# Patient Record
Sex: FEMALE | Race: OTHER | Employment: UNEMPLOYED | URBAN - METROPOLITAN AREA
[De-identification: names, ages, dates, MRNs, and addresses within clinical notes are randomized per-mention and may not be internally consistent; named-entity substitution may affect disease eponyms.]

---

## 2018-11-29 ENCOUNTER — HOSPITAL ENCOUNTER (EMERGENCY)
Facility: HOSPITAL | Age: 28
Discharge: HOME | End: 2018-11-29
Attending: EMERGENCY MEDICINE
Payer: MEDICAID

## 2018-11-29 VITALS
TEMPERATURE: 98 F | SYSTOLIC BLOOD PRESSURE: 109 MMHG | DIASTOLIC BLOOD PRESSURE: 61 MMHG | HEIGHT: 64 IN | BODY MASS INDEX: 43.71 KG/M2 | RESPIRATION RATE: 15 BRPM | HEART RATE: 79 BPM | OXYGEN SATURATION: 98 % | WEIGHT: 256 LBS

## 2018-11-29 DIAGNOSIS — N30.00 ACUTE CYSTITIS WITHOUT HEMATURIA: Primary | ICD-10-CM

## 2018-11-29 PROCEDURE — 96361 HYDRATE IV INFUSION ADD-ON: CPT

## 2018-11-29 PROCEDURE — 85025 COMPLETE CBC W/AUTO DIFF WBC: CPT | Performed by: EMERGENCY MEDICINE

## 2018-11-29 PROCEDURE — 87086 URINE CULTURE/COLONY COUNT: CPT | Performed by: EMERGENCY MEDICINE

## 2018-11-29 PROCEDURE — 80053 COMPREHEN METABOLIC PANEL: CPT | Performed by: EMERGENCY MEDICINE

## 2018-11-29 PROCEDURE — 81025 URINE PREGNANCY TEST: CPT

## 2018-11-29 PROCEDURE — 81001 URINALYSIS AUTO W/SCOPE: CPT | Performed by: EMERGENCY MEDICINE

## 2018-11-29 PROCEDURE — 96360 HYDRATION IV INFUSION INIT: CPT

## 2018-11-29 PROCEDURE — 99284 EMERGENCY DEPT VISIT MOD MDM: CPT

## 2018-11-29 RX ORDER — SULFAMETHOXAZOLE AND TRIMETHOPRIM 800; 160 MG/1; MG/1
1 TABLET ORAL 2 TIMES DAILY
Qty: 14 TABLET | Refills: 0 | Status: SHIPPED | OUTPATIENT
Start: 2018-11-29 | End: 2018-12-06

## 2018-11-29 RX ORDER — CEPHALEXIN 250 MG/1
250 CAPSULE ORAL 4 TIMES DAILY
COMMUNITY

## 2018-11-29 RX ORDER — SODIUM CHLORIDE 9 MG/ML
125 INJECTION, SOLUTION INTRAVENOUS CONTINUOUS
Status: DISCONTINUED | OUTPATIENT
Start: 2018-11-29 | End: 2018-11-29

## 2018-11-29 NOTE — ED INITIAL ASSESSMENT (HPI)
PT STATES SHE WAS DIAGNOSED WITH UTI 3 DAYS AGO, PT WAS GIVEN ANTIBIOTICS. PT IS HERE DUE TO SHE'S TILL EXPERIENCING PAIN ON HER LEFT LOWER BACK RADIATING TO THE FRONT.

## 2018-11-29 NOTE — ED PROVIDER NOTES
Patient Seen in: [facility] Emergency Department    History   Patient presents with:  Urinary Symptoms (urologic)  Back Pain (musculoskeletal)    Stated Complaint: Flank pain since last week.  Has UTI and has been on Cepalexin since 11/27    HPI    2 murmurs, regular rate and rhythm  Lungs: Clear to auscultation bilaterally  Abdomen: Soft nontender nondistended normal active bowel sounds without rebound, guarding or masses noted  Back left CVA region is mildly  Extremity no clubbing, cyanosis or edema records from the patient's emergency room visit the other day and her CT at that time was normal.  I do not feel exposing her second CT at this time is warranted but I feel the patient with her urine still being symptomatic in her urine consistent with uri

## 2018-11-29 NOTE — ED NOTES
PT REEVALUATED BY ER PHYSICIAN. INFORMED OF ALL HER TEST REPORTS AND PLAN OF CARE. PT VERBALIZING UNDERSTANDING.

## 2023-06-12 ENCOUNTER — APPOINTMENT (OUTPATIENT)
Dept: GENERAL RADIOLOGY | Facility: HOSPITAL | Age: 33
End: 2023-06-12
Attending: EMERGENCY MEDICINE
Payer: MEDICAID

## 2023-06-12 ENCOUNTER — HOSPITAL ENCOUNTER (OUTPATIENT)
Facility: HOSPITAL | Age: 33
Setting detail: OBSERVATION
Discharge: HOME | End: 2023-06-13
Attending: EMERGENCY MEDICINE | Admitting: HOSPITALIST
Payer: MEDICAID

## 2023-06-12 DIAGNOSIS — T78.2XXA ANAPHYLAXIS, INITIAL ENCOUNTER: ICD-10-CM

## 2023-06-12 DIAGNOSIS — J02.0 STREPTOCOCCAL SORE THROAT: ICD-10-CM

## 2023-06-12 DIAGNOSIS — A41.9 SEPSIS DUE TO UNDETERMINED ORGANISM (HCC): ICD-10-CM

## 2023-06-12 DIAGNOSIS — E87.20 METABOLIC ACIDOSIS: ICD-10-CM

## 2023-06-12 DIAGNOSIS — R73.9 HYPERGLYCEMIA: ICD-10-CM

## 2023-06-12 DIAGNOSIS — E11.65 TYPE 2 DIABETES MELLITUS WITH HYPERGLYCEMIA, WITHOUT LONG-TERM CURRENT USE OF INSULIN (HCC): Primary | ICD-10-CM

## 2023-06-12 PROBLEM — E87.1 HYPONATREMIA: Status: ACTIVE | Noted: 2023-06-12

## 2023-06-12 LAB
ALBUMIN SERPL-MCNC: 2.9 G/DL (ref 3.4–5)
ALBUMIN/GLOB SERPL: 0.6 {RATIO} (ref 1–2)
ALP LIVER SERPL-CCNC: 89 U/L
ALT SERPL-CCNC: 74 U/L
ANION GAP SERPL CALC-SCNC: 7 MMOL/L (ref 0–18)
AST SERPL-CCNC: 33 U/L (ref 15–37)
B-HCG UR QL: NEGATIVE
BASE EXCESS BLDV CALC-SCNC: -1.4 MMOL/L
BASOPHILS # BLD AUTO: 0.02 X10(3) UL (ref 0–0.2)
BASOPHILS NFR BLD AUTO: 0.2 %
BILIRUB SERPL-MCNC: 0.5 MG/DL (ref 0.1–2)
BILIRUB UR QL STRIP.AUTO: NEGATIVE
BUN BLD-MCNC: 13 MG/DL (ref 7–18)
CALCIUM BLD-MCNC: 8.6 MG/DL (ref 8.5–10.1)
CHLORIDE SERPL-SCNC: 103 MMOL/L (ref 98–112)
CLARITY UR REFRACT.AUTO: CLEAR
CO2 SERPL-SCNC: 18 MMOL/L (ref 21–32)
COLOR UR AUTO: YELLOW
CREAT BLD-MCNC: 0.98 MG/DL
EOSINOPHIL # BLD AUTO: 0.02 X10(3) UL (ref 0–0.7)
EOSINOPHIL NFR BLD AUTO: 0.2 %
ERYTHROCYTE [DISTWIDTH] IN BLOOD BY AUTOMATED COUNT: 14.7 %
GFR SERPLBLD BASED ON 1.73 SQ M-ARVRAT: 78 ML/MIN/1.73M2 (ref 60–?)
GLOBULIN PLAS-MCNC: 4.9 G/DL (ref 2.8–4.4)
GLUCOSE BLD-MCNC: 345 MG/DL (ref 70–99)
GLUCOSE BLD-MCNC: 358 MG/DL (ref 70–99)
GLUCOSE UR STRIP.AUTO-MCNC: >=500 MG/DL
HCO3 BLDV-SCNC: 23.8 MEQ/L (ref 22–26)
HCT VFR BLD AUTO: 42.6 %
HGB BLD-MCNC: 13.4 G/DL
IMM GRANULOCYTES # BLD AUTO: 0.03 X10(3) UL (ref 0–1)
IMM GRANULOCYTES NFR BLD: 0.2 %
KETONES UR STRIP.AUTO-MCNC: 20 MG/DL
LACTATE SERPL-SCNC: 2.4 MMOL/L (ref 0.4–2)
LACTATE SERPL-SCNC: 4.2 MMOL/L (ref 0.4–2)
LACTATE SERPL-SCNC: 4.8 MMOL/L (ref 0.4–2)
LEUKOCYTE ESTERASE UR QL STRIP.AUTO: NEGATIVE
LYMPHOCYTES # BLD AUTO: 1.63 X10(3) UL (ref 1–4)
LYMPHOCYTES NFR BLD AUTO: 13.2 %
MAGNESIUM SERPL-MCNC: 1.8 MG/DL (ref 1.6–2.6)
MCH RBC QN AUTO: 23.7 PG (ref 26–34)
MCHC RBC AUTO-ENTMCNC: 31.5 G/DL (ref 31–37)
MCV RBC AUTO: 75.3 FL
MONOCYTES # BLD AUTO: 0.26 X10(3) UL (ref 0.1–1)
MONOCYTES NFR BLD AUTO: 2.1 %
NEUTROPHILS # BLD AUTO: 10.36 X10 (3) UL (ref 1.5–7.7)
NEUTROPHILS # BLD AUTO: 10.36 X10(3) UL (ref 1.5–7.7)
NEUTROPHILS NFR BLD AUTO: 84.1 %
NITRITE UR QL STRIP.AUTO: POSITIVE
OSMOLALITY SERPL CALC.SUM OF ELEC: 281 MOSM/KG (ref 275–295)
OXYHGB MFR BLDV: 95.1 % (ref 72–78)
PCO2 BLDV: 27 MM HG (ref 38–50)
PH BLDV: 7.49 [PH] (ref 7.33–7.43)
PH UR STRIP.AUTO: 5 [PH] (ref 5–8)
PLATELET # BLD AUTO: 330 10(3)UL (ref 150–450)
PO2 BLDV: 83 MM HG (ref 30–50)
POTASSIUM SERPL-SCNC: 4.3 MMOL/L (ref 3.5–5.1)
PROT SERPL-MCNC: 7.8 G/DL (ref 6.4–8.2)
PROT UR STRIP.AUTO-MCNC: NEGATIVE MG/DL
RBC # BLD AUTO: 5.66 X10(6)UL
SARS-COV-2 RNA RESP QL NAA+PROBE: NOT DETECTED
SODIUM SERPL-SCNC: 128 MMOL/L (ref 136–145)
SP GR UR STRIP.AUTO: >1.03 (ref 1–1.03)
UROBILINOGEN UR STRIP.AUTO-MCNC: <2 MG/DL
WBC # BLD AUTO: 12.3 X10(3) UL (ref 4–11)

## 2023-06-12 PROCEDURE — 71045 X-RAY EXAM CHEST 1 VIEW: CPT | Performed by: EMERGENCY MEDICINE

## 2023-06-12 PROCEDURE — 99223 1ST HOSP IP/OBS HIGH 75: CPT | Performed by: HOSPITALIST

## 2023-06-12 PROCEDURE — 93010 ELECTROCARDIOGRAM REPORT: CPT | Performed by: INTERNAL MEDICINE

## 2023-06-12 RX ORDER — SODIUM CHLORIDE 9 MG/ML
INJECTION, SOLUTION INTRAVENOUS CONTINUOUS
Status: ACTIVE | OUTPATIENT
Start: 2023-06-12 | End: 2023-06-12

## 2023-06-12 RX ORDER — DEXTROSE MONOHYDRATE 25 G/50ML
50 INJECTION, SOLUTION INTRAVENOUS
Status: DISCONTINUED | OUTPATIENT
Start: 2023-06-12 | End: 2023-06-13

## 2023-06-12 RX ORDER — DIPHENHYDRAMINE HYDROCHLORIDE 50 MG/ML
75 INJECTION INTRAMUSCULAR; INTRAVENOUS ONCE
Status: COMPLETED | OUTPATIENT
Start: 2023-06-12 | End: 2023-06-12

## 2023-06-12 RX ORDER — FAMOTIDINE 10 MG/ML
20 INJECTION, SOLUTION INTRAVENOUS ONCE
Status: COMPLETED | OUTPATIENT
Start: 2023-06-12 | End: 2023-06-12

## 2023-06-12 RX ORDER — ENOXAPARIN SODIUM 100 MG/ML
0.5 INJECTION SUBCUTANEOUS DAILY
Status: DISCONTINUED | OUTPATIENT
Start: 2023-06-13 | End: 2023-06-13

## 2023-06-12 RX ORDER — ACETAMINOPHEN 500 MG
500 TABLET ORAL EVERY 4 HOURS PRN
Status: DISCONTINUED | OUTPATIENT
Start: 2023-06-12 | End: 2023-06-13

## 2023-06-12 RX ORDER — PROCHLORPERAZINE EDISYLATE 5 MG/ML
5 INJECTION INTRAMUSCULAR; INTRAVENOUS EVERY 8 HOURS PRN
Status: DISCONTINUED | OUTPATIENT
Start: 2023-06-12 | End: 2023-06-13

## 2023-06-12 RX ORDER — NICOTINE POLACRILEX 4 MG
30 LOZENGE BUCCAL
Status: DISCONTINUED | OUTPATIENT
Start: 2023-06-12 | End: 2023-06-13

## 2023-06-12 RX ORDER — NICOTINE POLACRILEX 4 MG
15 LOZENGE BUCCAL
Status: DISCONTINUED | OUTPATIENT
Start: 2023-06-12 | End: 2023-06-13

## 2023-06-12 RX ORDER — ONDANSETRON 2 MG/ML
4 INJECTION INTRAMUSCULAR; INTRAVENOUS EVERY 4 HOURS PRN
Status: DISCONTINUED | OUTPATIENT
Start: 2023-06-12 | End: 2023-06-12

## 2023-06-12 RX ORDER — INSULIN ASPART 100 [IU]/ML
0.15 INJECTION, SOLUTION INTRAVENOUS; SUBCUTANEOUS ONCE
Status: COMPLETED | OUTPATIENT
Start: 2023-06-12 | End: 2023-06-12

## 2023-06-12 RX ORDER — METHYLPREDNISOLONE SODIUM SUCCINATE 125 MG/2ML
125 INJECTION, POWDER, LYOPHILIZED, FOR SOLUTION INTRAMUSCULAR; INTRAVENOUS ONCE
Status: COMPLETED | OUTPATIENT
Start: 2023-06-12 | End: 2023-06-12

## 2023-06-12 RX ORDER — SODIUM CHLORIDE 9 MG/ML
INJECTION, SOLUTION INTRAVENOUS CONTINUOUS
Status: DISCONTINUED | OUTPATIENT
Start: 2023-06-12 | End: 2023-06-13

## 2023-06-12 RX ORDER — ONDANSETRON 2 MG/ML
4 INJECTION INTRAMUSCULAR; INTRAVENOUS EVERY 6 HOURS PRN
Status: DISCONTINUED | OUTPATIENT
Start: 2023-06-12 | End: 2023-06-13

## 2023-06-12 NOTE — ED INITIAL ASSESSMENT (HPI)
Allergic reactions since last night after she  Took some naproxen and cyclobenzaprine  Yesterday . Patient  Seen her doctor because of right arm numbness. Now swelling all over the face rashes mild shortness of breath .

## 2023-06-12 NOTE — ED QUICK NOTES
Orders for admission, patient is aware of plan and ready to go upstairs. Any questions, please call ED RN kendell at extension [phone]. Patient Covid vaccination status: Unvaccinated     COVID Test Ordered in ED: Rapid SARS-CoV-2 by PCR    COVID Suspicion at Admission: N/A    Running Infusions: sepsis bolus almost finished     Mental Status/LOC at time of transport: a&Ox4    Other pertinent information: probable DKA, sepsis bolus almost done, pending blood cultures. Repeat lactic will be drawn prior to coming up.    CIWA score: N/A   NIH score:  N/A

## 2023-06-13 VITALS
TEMPERATURE: 98 F | BODY MASS INDEX: 45.14 KG/M2 | HEIGHT: 62 IN | OXYGEN SATURATION: 94 % | DIASTOLIC BLOOD PRESSURE: 71 MMHG | RESPIRATION RATE: 18 BRPM | WEIGHT: 245.31 LBS | SYSTOLIC BLOOD PRESSURE: 119 MMHG | HEART RATE: 89 BPM

## 2023-06-13 LAB
ANION GAP SERPL CALC-SCNC: 7 MMOL/L (ref 0–18)
ATRIAL RATE: 115 BPM
BASOPHILS # BLD AUTO: 0.01 X10(3) UL (ref 0–0.2)
BASOPHILS NFR BLD AUTO: 0.1 %
BUN BLD-MCNC: 8 MG/DL (ref 7–18)
CALCIUM BLD-MCNC: 8.1 MG/DL (ref 8.5–10.1)
CHLORIDE SERPL-SCNC: 107 MMOL/L (ref 98–112)
CO2 SERPL-SCNC: 23 MMOL/L (ref 21–32)
CREAT BLD-MCNC: 0.47 MG/DL
EOSINOPHIL # BLD AUTO: 0 X10(3) UL (ref 0–0.7)
EOSINOPHIL NFR BLD AUTO: 0 %
ERYTHROCYTE [DISTWIDTH] IN BLOOD BY AUTOMATED COUNT: 14.6 %
EST. AVERAGE GLUCOSE BLD GHB EST-MCNC: 246 MG/DL (ref 68–126)
GFR SERPLBLD BASED ON 1.73 SQ M-ARVRAT: 128 ML/MIN/1.73M2 (ref 60–?)
GLUCOSE BLD-MCNC: 282 MG/DL (ref 70–99)
GLUCOSE BLD-MCNC: 287 MG/DL (ref 70–99)
GLUCOSE BLD-MCNC: 294 MG/DL (ref 70–99)
HBA1C MFR BLD: 10.2 % (ref ?–5.7)
HCT VFR BLD AUTO: 34.6 %
HGB BLD-MCNC: 10.5 G/DL
IMM GRANULOCYTES # BLD AUTO: 0.04 X10(3) UL (ref 0–1)
IMM GRANULOCYTES NFR BLD: 0.4 %
LYMPHOCYTES # BLD AUTO: 1.52 X10(3) UL (ref 1–4)
LYMPHOCYTES NFR BLD AUTO: 16.2 %
MCH RBC QN AUTO: 23.1 PG (ref 26–34)
MCHC RBC AUTO-ENTMCNC: 30.3 G/DL (ref 31–37)
MCV RBC AUTO: 76 FL
MONOCYTES # BLD AUTO: 0.24 X10(3) UL (ref 0.1–1)
MONOCYTES NFR BLD AUTO: 2.6 %
NEUTROPHILS # BLD AUTO: 7.56 X10 (3) UL (ref 1.5–7.7)
NEUTROPHILS # BLD AUTO: 7.56 X10(3) UL (ref 1.5–7.7)
NEUTROPHILS NFR BLD AUTO: 80.7 %
OSMOLALITY SERPL CALC.SUM OF ELEC: 293 MOSM/KG (ref 275–295)
P AXIS: 67 DEGREES
P-R INTERVAL: 144 MS
PLATELET # BLD AUTO: 259 10(3)UL (ref 150–450)
POTASSIUM SERPL-SCNC: 3.9 MMOL/L (ref 3.5–5.1)
Q-T INTERVAL: 370 MS
QRS DURATION: 132 MS
QTC CALCULATION (BEZET): 511 MS
R AXIS: -17 DEGREES
RBC # BLD AUTO: 4.55 X10(6)UL
SODIUM SERPL-SCNC: 137 MMOL/L (ref 136–145)
T AXIS: 46 DEGREES
VENTRICULAR RATE: 115 BPM
WBC # BLD AUTO: 9.4 X10(3) UL (ref 4–11)

## 2023-06-13 PROCEDURE — 99232 SBSQ HOSP IP/OBS MODERATE 35: CPT | Performed by: CLINICAL NURSE SPECIALIST

## 2023-06-13 PROCEDURE — 99239 HOSP IP/OBS DSCHRG MGMT >30: CPT | Performed by: HOSPITALIST

## 2023-06-13 RX ORDER — METFORMIN HYDROCHLORIDE 750 MG/1
750 TABLET, EXTENDED RELEASE ORAL DAILY
Qty: 30 TABLET | Refills: 0 | Status: SHIPPED | OUTPATIENT
Start: 2023-06-13

## 2023-06-13 RX ORDER — MAGNESIUM OXIDE 400 MG/1
400 TABLET ORAL ONCE
Status: COMPLETED | OUTPATIENT
Start: 2023-06-13 | End: 2023-06-13

## 2023-06-13 RX ORDER — BLOOD-GLUCOSE METER
1 EACH MISCELLANEOUS
Qty: 1 KIT | Refills: 0 | Status: SHIPPED | OUTPATIENT
Start: 2023-06-13

## 2023-06-13 RX ORDER — LANCETS
EACH MISCELLANEOUS
Qty: 100 EACH | Refills: 2 | Status: SHIPPED | OUTPATIENT
Start: 2023-06-13 | End: 2023-12-10

## 2023-06-13 RX ORDER — PENICILLIN V POTASSIUM 500 MG/1
500 TABLET ORAL 3 TIMES DAILY
Qty: 30 TABLET | Refills: 0 | Status: SHIPPED | OUTPATIENT
Start: 2023-06-13 | End: 2023-06-23

## 2023-06-13 RX ORDER — INSULIN LISPRO 100 [IU]/ML
INJECTION, SOLUTION INTRAVENOUS; SUBCUTANEOUS
Qty: 5 EACH | Refills: 0 | Status: SHIPPED | OUTPATIENT
Start: 2023-06-13

## 2023-06-13 RX ORDER — INSULIN GLARGINE 100 [IU]/ML
30 INJECTION, SOLUTION SUBCUTANEOUS EVERY MORNING
Qty: 5 EACH | Refills: 0 | Status: SHIPPED | OUTPATIENT
Start: 2023-06-13

## 2023-06-13 RX ORDER — PERPHENAZINE 16 MG/1
TABLET, FILM COATED ORAL
Qty: 100 STRIP | Refills: 2 | Status: SHIPPED | OUTPATIENT
Start: 2023-06-13

## 2023-06-13 NOTE — PLAN OF CARE
Patient A/O x4. VSS, Afebrile. Ambulatory. Voids. 1800 ADA diet; tolerating well. New onset Diabetes. Diabetes educator consulted. New onset and Carb Count videos watched by patient. Glucose monitoring and insulin injection education given at beside by this RN. All medications given per [facility]. Safety precautions in place. Call light within reach. Patient has been cleared for discharge. NURSING DISCHARGE NOTE    Discharged Home via Ambulatory. Accompanied by RN  Belongings Taken by patient/family.       Problem: Diabetes/Glucose Control  Goal: Glucose maintained within prescribed range  Description: INTERVENTIONS:  - Monitor Blood Glucose as ordered  - Assess for signs and symptoms of hyperglycemia and hypoglycemia  - Administer ordered medications to maintain glucose within target range  - Assess barriers to adequate nutritional intake and initiate nutrition consult as needed  - Instruct patient on self management of diabetes  Outcome: Progressing

## 2023-06-13 NOTE — PLAN OF CARE
NURSING ADMISSION NOTE      Patient admitted via cart. Oriented to room. Safety precautions initiated. Bed in low position. Call light in reach. Received pt alert and oriented x4. VSS. Afebrile. Pt c/o neck pain but no other complaints. Navigator completed at bedside. IVF infusing. Insulin administered per MAR orders. Safety precautions in place and call light within reach.

## 2023-06-13 NOTE — CM/SW NOTE
SW and RN discussed patient during morning rounds. At this time, there are no identified discharge planning needs per nursing. Please place consult for social work if any needs are determined. SW will continue to follow for plan of care changes and remain available for any additional DC needs or concerns.      Chaim Tobar MSW, LSW  Discharge Planner   [phone]

## 2023-06-14 ENCOUNTER — PATIENT MESSAGE (OUTPATIENT)
Dept: ENDOCRINOLOGY CLINIC | Facility: CLINIC | Age: 33
End: 2023-06-14

## 2023-06-15 NOTE — DISCHARGE SUMMARY
[facility]ITAL[facility]  DISCHARGE SUMMARY     Dulce Maria Jasso Patient Status:  Inpatient    1990 MRN [id number]   [facility] 4NW-A Attending [facility] Day # 1 PCP Hellen Celaya MD     Date of Admission:  2023  Date of Discharge:   2023    Discharge Disposition: Home or Self Care    Discharge Diagnosis:    #New onset DM2 with hyperglycemia; A1c: 10.2  #Strep throat  #Metabolic Acidosis  #allergic reaction      History of Present Illness:    Bess Clark is a 28year old female with no significant past medical history presents to the ER with complaints of weakness and possible allergic reaction. Patient was seen at [facility] in the ER yesterday. She presented to the ER with right arm numbness and weakness. She received a shot of Toradol and was discharged home with Flexeril. She states that when she returned home she had trouble breathing and developed a rash all over her body. Patient states her symptoms persisted and she came into the ER for further evaluation. She denies any previous allergies to medications or unusual foods been ingested. On arrival, work-up was consistent with significantly elevated lactic acid. Patient was also noted to be hyerglycemic. She denies previous history of DM but states she had gestational diabetes over 10 years ago with one her children. She also c/o polyura, polydipsia and weight gain. Brief Synopsis:    The patient was admitted secondary to new onset type 2 diabetes and strep throat. She was started on insulin and antibiotics. She remained medically stable. She also had allergic reaction in the emergency room. Allergic symptoms had completely resolved. She was stable for discharge home on a course of antibiotics and insulin for her diabetes. She will follow-up with an allergist as outpatient. She is to follow with her primary care doctor as well.     Lace+ Score: 28  59-90 High Risk  29-58 Medium Risk  0-28   Low Risk       TCM Follow-Up Recommendation:  LACE 29-58: Moderate Risk of readmission after discharge from the hospital.        Discharge Medication List:     Discharge Medications        START taking these medications        Instructions Prescription details   Contour Next EZ w/Device Kit      1 kit 3 (three) times daily before meals. Test blood glucose 3-4 times before meals and for signs/symptoms of hypoglycemia   Quantity: 1 kit  Refills: 0     Contour Next Test Strp  Generic drug: Glucose Blood      Test blood glucose 3-4 times before meals and for signs/symptoms of hypoglycemia   Quantity: 100 strip  Refills: 2     Insulin Lispro (1 Unit Dial) 100 UNIT/ML Sopn  Commonly known as: HumaLOG KwikPen      Test blood glucose 3 times daily before meals. Inject 1 unit of insulin for every 30 points blood glucose is greater than 140 mg/dL. Inject insulin into the skin prior to meals. Inject 1 unit of insulin for every 8 grams of carbohydrates eaten Inject within 10 minutes before or after a meal.   Quantity: 5 each  Refills: 0     Lantus SoloStar 100 UNIT/ML Sopn  Generic drug: insulin glargine      Inject 30 Units into the skin every morning. Quantity: 5 each  Refills: 0     metFORMIN  MG Tb24  Commonly known as: Glucophage XR      Take 1 tablet (750 mg total) by mouth daily. Quantity: 30 tablet  Refills: 0     Microlet Lancets Misc      Test blood glucose 3-4 times before meals and for signs/symptoms of hypoglycemia   Quantity: 100 each  Refills: 2     penicillin v potassium 500 MG Tabs  Commonly known as: Veetid      Take 1 tablet (500 mg total) by mouth 3 (three) times daily for 10 days.    Stop taking on: June 23, 2023  Quantity: 30 tablet  Refills: 0            STOP taking these medications      cephalexin 250 MG Caps  Commonly known as: Keflex                  Where to Get Your Medications        These medications were sent to [address] [facility], [address] AT [facility], [phone], [phone]  [address] [facility], [address]      Phone: [phone]   Contour Next Test Strp  Insulin Lispro (1 Unit Dial) 100 UNIT/ML Sopn  Lantus SoloStar 100 UNIT/ML Sopn  metFORMIN  MG Tb24  penicillin v potassium 500 MG Tabs       Please  your prescriptions at the location directed by your doctor or nurse    Bring a paper prescription for each of these medications  Contour Next EZ w/Device Kit  Microlet Lancets Misc         [place]    Follow-up appointment:   Raul Alcazar  [id number] [phone]    Go on 6/22/2023  For your hospital follow up  Thursday 6/22 @9am    Appointments for Next 30 Days 6/14/2023 - 7/14/2023        Date Arrival Time Visit Type Length Department Provider     6/16/2023  9:30 AM  DIAB FOLLOW UP [1917] 60 min [address], [facility], [address] [place] [address]    Patient Instructions:     Classes and/or appointments are held at the [facility] located at [place] [address], [address] [facility], [address]    WHO TO CONTACT TO CHANGE APPOINTMENT   If you need to make changes to your appointment or have questions,  please call the Diabetes Center at [phone]. TOPICS DISCUSSED IN CLASS/APPOINTMENT  INSULIN PUMP INDVIDUALIZED ASSESSMENT  This is a one-on-one appointment with a certified diabetes educator. An assessment will be performed and will identify the pump candidate's ability to:  Perform label reading Identify portion sizes  Use insulin to carbohydrate ratio/correction factor accurately  Accurately treat hypoglycemia using the Rule of 15    Following the assessment, education will be customized to assessed  educational needs. Basal rates, insulin to carbohydrate and  correction factor ratios will be calculated and, if already in use,  will  be reviewed for accuracy and recalculated if necessary.     INDIVIDUAL FOLLOW-UP APPOINTMENTS  To be determined at appointment    [id number]  [address]  An assessment and meter training will begin this appointment. Educational needs will be discussed at that time. STEP ONE SATISFIER  This is a 1-hour appointment taught by a registered dietitian and  diabetes educator. Half of this appointment time is dedicated to the  development of a personalized education plan. This plan is developed  privately in a 1:1 session with the educator. The other half hour is  devoted  to learning to use a blood glucose meter. FAMILY INVOLVEMENT  Insurance expects the patient (child or adult) to be present for the appointment. This class is intended for adult patients, teen patients with adult  family member and parents of young children  with diabetes. Please make arrangements for someone to care for your children under  the age of 6. ARRIVAL TIME  Patients should arrive 15 minutes prior to their appointment time. INSURANCE QUESTIONS  Please bring your insurance card, 's license/ID, the order for  education from your doctor, any pertinent lab results and a list of  medications patient is taking. Should you have questions about  insurance coverage, call the [phone] on the back of your  insurance card.                    2023 11:15 AM  LLUVIA DM PUMP/CGM FOLLOW UP [4879] 45 min [facility] MAURICE Watson    Patient Instructions:                           Vital signs:       Physical Exam:    General: No acute distress   Lungs: clear to auscultation  Cardiovascular: S1, S2  Abdomen: Soft      -----------------------------------------------------------------------------------------------  PATIENT DISCHARGE INSTRUCTIONS: See electronic chart    Redia Cockayne, MD    Total minutes spent on discharge plannin      The [facility] makes medical notes like these available to patients in the interest of transparency. Please be advised this is a medical document. Medical documents are intended to carry relevant information, facts as evident, and the clinical opinion of the practitioner. The medical note is intended as peer to peer communication and may appear blunt or direct. It is written in medical language and may contain abbreviations or verbiage that are unfamiliar.

## 2023-06-16 ENCOUNTER — DIABETIC EDUCATION (OUTPATIENT)
Dept: ENDOCRINOLOGY CLINIC | Facility: CLINIC | Age: 33
End: 2023-06-16
Payer: MEDICAID

## 2023-06-16 DIAGNOSIS — E11.65 TYPE 2 DIABETES MELLITUS WITH HYPERGLYCEMIA, WITH LONG-TERM CURRENT USE OF INSULIN (HCC): Primary | ICD-10-CM

## 2023-06-16 DIAGNOSIS — Z79.4 TYPE 2 DIABETES MELLITUS WITH HYPERGLYCEMIA, WITH LONG-TERM CURRENT USE OF INSULIN (HCC): Primary | ICD-10-CM

## 2023-06-16 PROCEDURE — 99211 OFF/OP EST MAY X REQ PHY/QHP: CPT

## 2023-06-16 RX ORDER — PEN NEEDLE, DIABETIC 32GX 5/32"
NEEDLE, DISPOSABLE MISCELLANEOUS
Qty: 400 EACH | Refills: 3 | Status: SHIPPED | OUTPATIENT
Start: 2023-06-16

## 2023-06-16 NOTE — PROGRESS NOTES
John Edmond  : 1990 was seen for Diabetic Education, initial visit:    Date: 2023  Referring Provider: Khalida Hodge  Start time: 9:30 am End time: 10:30am    Assessment:      Reason for visit: Pt referred from [facility]. She was diagnosed with diabetes while admitted to the hospital for a possible allergic reaction to Toradol (discharged on 23). Blood glucose was at 358 mg/dL. Pt was sent home on Metformin, Glargine and Lispro. She had a previous gestational diabetes (over 10 year ago) but has not had labs done in years. HgbA1C (%)   Date Value   2023 10.2 (H)      Wt Readings from Last 6 Encounters:  23 : 245 lb 4.8 oz  18 : 256 lb      Current DM management:   Metformin 750  mg once daily  Lantus 30 units daily - Not taking, no pen needles  Lispro 1 unit 30 points blood glucose is greater than 140 mg/dL. - Not taking, no pen needles  1 unit of insulin for every 8 grams of carbohydrates eaten Inject within 10 minutes before or after a meal.      Currently testing BG: Yes, reviewed logs      (done in office)  224 mg/dL-  Fasting    6/15  238 (9:22 am fasting)  219 (4:10 pm 2hr after meal)      237  305  184    Obtained diet history: Eats 3 meals daily, with 1 snack. Works at [employer] from - 4pm-10pm/12am  Breakfast: Wake at 9, eat at 9:30 am, Eggs with spinach, cereal w/milk  Lunch: 12pm - Rice, salad, mole, meat, taco, beans/cheese, asparagas, cactus/Nepole  Dinner: Eats at work- [employer] and fries, If at home- soups, tacos, rice beans, meat  Beverages: Stopped drinking pop, ice tea sweetened, flavored water, water  Snacks: grapes, oranges, cherries, mangos, fruit, yogurt    Currently exercising: Not much at this time.        Education:     Diabetes Overview  Reviewed diabetes pathophysiology, Discussed current HgbA1C, Discussed benefits of blood glucose control and blood glucose targets    Healthy Eating  Reviewed basic nutrition concepts for diabetes management, MyPlate. , Reviewed carbohydrate counting and label reading. Being Active  Benefits and effect of activity on BG discussed. Reviewed when to call MD and benefits of goal setting. Monitoring  Reinforced glucose monitoring schedules: four times daily, fasting and before each meal    Taking Medication  Reviewed medication use, action, timing, and side effects. Injectables: Site selection, rotation, action, timing reviewed. Reducing Risk  Reviewed overview of complications including: eye, CV health and renal protection    Health Coping  Family involvement/support encouraged  Depression and diabetes reviewed. Recommendations:     1. Monitor carbohydrate intake with the MyPlate method   2. Take medications as prescribed  3. Practice carbohydrate counting and label reading  4. Increase or continue physical activity of at least 150 mins, over at least 3 days a week or per doctors recommendations. Recommended Changes:  Reviewed with LLUVIA Pimentel  Metformin 750  mg once daily - continue  Lantus 30 units daily -> reduce to 15u daily  Lispro 1 unit 30 points blood glucose is greater than 140 mg/dL. or Take 5 units at the start of a meal, three times daily    1 unit of insulin for every 8 grams of carbohydrates eaten Inject within 10 minutes before or after start of meal. -> No carb counting at this time. Patients personal goals:     1. Testing sugars 4 times daily  2. Taking medications as prescribed  3. Monitor carbohydrate intake. Follow ups:  6/28/2023 MAURICE Sosa  7/6/2023 Venancio ALONSO    Patient verbalized understanding and has no further questions at this time.     Sparkle Ang, MATN, LDN, 1 [facility]  Certified Diabetes Care and   Registered Dietitian

## 2023-06-16 NOTE — PATIENT INSTRUCTIONS
Recommendations:     Monitor carbohydrate intake with the MyPlate method   Take medications as prescribed  Practice carbohydrate counting and label reading  Increase or continue physical activity of at least 150 mins, over at least 3 days a week or per doctors recommendations. Blood sugar goals: less than 130 mg/dl in the morning and less than 180 mg/dl 2 hours after meals. Keep glucose tabs or fast acting carbohydrates with you for treatment of lows; for blood glucose levels less than 70 mg/dl, take 15 grams of fast acting carbohydrates (3-4 glucose tabs, 4 ounces of juice, or as discussed). Retest in 15 min. If not above 70 mg/dl, retreat. Recommended Changes:   Metformin 750  mg once daily - continue  Lantus 30 units daily -> change to 15u daily  Lispro 1 unit 30 points blood glucose is greater than 140 mg/dL or 5 units at the start of a meal, three times daily    No carb counting at this time. Patients personal goals:     Testing sugars 4 times daily  Taking medications as prescribed  Monitor carbohydrate intake. If you experience any hypoglycemic events please call the office. Follow ups:  6/28/2023 MAURICE Hill  7/6/2023 Nellie ALONSO    Thank you for including me in your healthcare.      Caitlin Briscoe, MATN, LDN, 1 [facility]  Certified Diabetes Care and   Registered Dietitian

## 2023-06-28 ENCOUNTER — TELEMEDICINE (OUTPATIENT)
Dept: ENDOCRINOLOGY CLINIC | Facility: CLINIC | Age: 33
End: 2023-06-28
Payer: MEDICAID

## 2023-06-28 ENCOUNTER — TELEPHONE (OUTPATIENT)
Dept: ENDOCRINOLOGY CLINIC | Facility: CLINIC | Age: 33
End: 2023-06-28

## 2023-06-28 ENCOUNTER — VIRTUAL PHONE E/M (OUTPATIENT)
Dept: ENDOCRINOLOGY CLINIC | Facility: CLINIC | Age: 33
End: 2023-06-28
Payer: MEDICAID

## 2023-06-28 DIAGNOSIS — E11.65 TYPE 2 DIABETES MELLITUS WITH HYPERGLYCEMIA, WITH LONG-TERM CURRENT USE OF INSULIN (HCC): Primary | ICD-10-CM

## 2023-06-28 DIAGNOSIS — Z79.4 TYPE 2 DIABETES MELLITUS WITH HYPERGLYCEMIA, WITH LONG-TERM CURRENT USE OF INSULIN (HCC): Primary | ICD-10-CM

## 2023-06-28 PROCEDURE — 99215 OFFICE O/P EST HI 40 MIN: CPT | Performed by: NURSE PRACTITIONER

## 2023-06-28 RX ORDER — PEN NEEDLE, DIABETIC 32GX 5/32"
NEEDLE, DISPOSABLE MISCELLANEOUS
Qty: 400 EACH | Refills: 0 | Status: SHIPPED | OUTPATIENT
Start: 2023-06-28

## 2023-06-28 RX ORDER — INSULIN LISPRO 100 [IU]/ML
5 INJECTION, SOLUTION INTRAVENOUS; SUBCUTANEOUS
Qty: 30 ML | Refills: 1 | Status: SHIPPED | OUTPATIENT
Start: 2023-06-28

## 2023-06-28 RX ORDER — INSULIN GLARGINE 100 [IU]/ML
25 INJECTION, SOLUTION SUBCUTANEOUS EVERY MORNING
Qty: 15 ML | Refills: 1 | Status: SHIPPED | OUTPATIENT
Start: 2023-06-28

## 2023-06-29 ENCOUNTER — NURSE ONLY (OUTPATIENT)
Dept: ENDOCRINOLOGY CLINIC | Facility: CLINIC | Age: 33
End: 2023-06-29
Payer: MEDICAID

## 2023-06-29 NOTE — TELEPHONE ENCOUNTER
From: Nelida Wang  To: Gala Nobles  Sent: 6/14/2023 12:35 PM CDT  Subject: Upcoming visit    Jacob Whiting,     In regards to your upcoming visit, I just wanted to ask if you can bring your glucose meter with you. Please let me know if you have any questions.  Looking forward to meeting you on Friday 6/16 at 9:30am.       Yaa Alcala RDN, LDN, 1 Pending [facility] to [facility]  Certified Diabetes Care and   Registered Dietitian

## 2023-07-06 ENCOUNTER — TELEPHONE (OUTPATIENT)
Dept: ENDOCRINOLOGY CLINIC | Facility: CLINIC | Age: 33
End: 2023-07-06

## 2023-07-06 ENCOUNTER — DIABETIC EDUCATION (OUTPATIENT)
Dept: ENDOCRINOLOGY CLINIC | Facility: CLINIC | Age: 33
End: 2023-07-06
Payer: MEDICAID

## 2023-07-06 VITALS — WEIGHT: 239.63 LBS | BODY MASS INDEX: 44 KG/M2

## 2023-07-06 DIAGNOSIS — Z79.4 TYPE 2 DIABETES MELLITUS WITH HYPERGLYCEMIA, WITH LONG-TERM CURRENT USE OF INSULIN (HCC): Primary | ICD-10-CM

## 2023-07-06 DIAGNOSIS — E11.65 TYPE 2 DIABETES MELLITUS WITH HYPERGLYCEMIA, WITH LONG-TERM CURRENT USE OF INSULIN (HCC): Primary | ICD-10-CM

## 2023-07-06 PROCEDURE — 99211 OFF/OP EST MAY X REQ PHY/QHP: CPT

## 2023-07-06 NOTE — PATIENT INSTRUCTIONS
It was a please to see you again today Cecil Jeffrey. You have done a great job monitoring your diet and medications. Please schedule an appointment with me after you see Syd Jimenez on 7/13 if you have more questions. I would like to see your morning sugars moving to under 200 mg/dL. I think with the changes we spoke about today that will be achievable. Recommendations:     Practice healthful eating patterns, emphasizing a variety of nutrient dense foods in appropriate portion sizes. Take medications as prescribed  Practice carbohydrate counting and label reading  Increase or continue physical activity of at least 150 mins, over at least 3 days a week or per doctors recommendations. Improve glycemic control working towards an A1c of 7.0% or less  Pair carbohydrates and protein at each meal.       Patients personal goals:     Testing sugars 2 times daily -> Will need to increase to 4 with insulin dosing change  Increase physical activity daily to 30 mins  Monitor carbohydrate intake. Continue to count carbs. Follow up:   7/13/2023 MAURICE Barrett  Follow up with [facility] in 1 month, or as needed.        Glo James, MATN, LDN, 1 [facility]  Certified Diabetes Care and   Registered Dietitian

## 2023-07-06 NOTE — PROGRESS NOTES
Blake Shipley  : 1990 was seen for Diabetic Education, follow up visit:    Date: 2023  Referring Provider: Neda TEIXEIRA   Start time: 10:59 am End time: 11:32 am    Assessment:     Reason for visit: Follow up to review insulin injection and questions on carbohydrates. HgbA1C (%)   Date Value   2023 10.2 (H)      Current DM management:   Metformin  mg BID  Lantus 25 units daily injection - She's currently taking 28u   Humalog 5 units: Inject 10-15 minutes before meals 3 times daily  Taking correctly: Yes    Currently testing BG: Yes,  1 times/day fasting  Reviewed Glucose Logs: Yes    Reviewed CGMS download and patient logs:  Wearing a Vu Pro- will be reviewed at next visit with LLUVIA,     Per glucose meter: Mostly Fasting numbers  7/6 am:  264  7/5 am: 231  7/3 am: 225  7/2 am: 252  / am: 271  30 am: 284   3pm: 296   6-29 am: 260      Obtained diet history  Since the last visit the pt has been more aware of foods with carbohydrates. She has cut back on her serving sizes. When she would normally have 4 tortillas, she now has 2. Also has not had more than 4oz of a sweetened beverage at a time. Currently exercising: Increased her amount of exercise since last visit. Walking every day - park 3 blocks, walking at work more, Walking at the mall    Visit comments: Pt has been making changes in order to improve her glucose numbers by modifying her diet and being more active. She has also been taking her medications as prescribed. She noted would be comfortable adding a sliding scale and/or carb counting to her insulin routine after CDCES explained the process.        Education:     Diabetes Overview  Discussed benefits of blood glucose control and blood glucose targets    Healthy Eating  Reviewed basic nutrition concepts for diabetes management, MyPlate. , Reviewed heart healthy diet (low fat, low saturated fat, high fiber, reduced sodium)    Being Active  Benefits and effect of activity on BG discussed. Reviewed when to call MD and benefits of goal setting. Monitoring  Reinforced glucose monitoring schedules: Test 1 fasting and 1 post meal daily. Taking Medication  Reviewed medication use, action, timing, and side effects. Injectables: Site selection, rotation, action, timing reviewed. Health Coping  Family involvement/support encouraged  Depression and diabetes reviewed. Recommendations:     Practice healthful eating patterns, emphasizing a variety of nutrient dense foods in appropriate portion sizes. Take medications as prescribed  Practice carbohydrate counting and label reading  Increase or continue physical activity of at least 150 mins, over at least 3 days a week or per doctors recommendations. Improve glycemic control working towards an A1c of 7.0% or less  Pair carbohydrates and protein at each meal.       Patients personal goals:     Testing sugars 2 times daily -> Will need to increase to 4 with insulin dosing change  Increase physical activity daily to 30 mins  Monitor carbohydrate intake. Continue to count carbs. Follow up:   7/13/2023 MAURICE Lovett  Follow up with GAVIN as needed. Patient verbalized understanding and has no further questions at this time.     Kirsty Espinoza, MATN, LDN, 1 [facility]  Certified Diabetes Care and   Registered Dietitian

## 2023-07-06 NOTE — TELEPHONE ENCOUNTER
Called [facility] and relayed message from [facility] to increase her Lantus from 28 units->34 units. Pt repeated new order and had no further questions. [facility] with any questions or concerns with medication or blood sugar.  She follows up with Syd Jimenez on 7/13/23

## 2023-07-13 ENCOUNTER — OFFICE VISIT (OUTPATIENT)
Dept: ENDOCRINOLOGY CLINIC | Facility: CLINIC | Age: 33
End: 2023-07-13
Payer: MEDICAID

## 2023-07-13 VITALS
SYSTOLIC BLOOD PRESSURE: 102 MMHG | HEART RATE: 79 BPM | WEIGHT: 240.81 LBS | BODY MASS INDEX: 44 KG/M2 | DIASTOLIC BLOOD PRESSURE: 60 MMHG | OXYGEN SATURATION: 99 % | RESPIRATION RATE: 17 BRPM

## 2023-07-13 DIAGNOSIS — E11.65 TYPE 2 DIABETES MELLITUS WITH HYPERGLYCEMIA, WITH LONG-TERM CURRENT USE OF INSULIN (HCC): Primary | ICD-10-CM

## 2023-07-13 DIAGNOSIS — Z79.4 TYPE 2 DIABETES MELLITUS WITH HYPERGLYCEMIA, WITH LONG-TERM CURRENT USE OF INSULIN (HCC): Primary | ICD-10-CM

## 2023-07-13 LAB
GLUCOSE BLOOD: 248
TEST STRIP LOT #: NORMAL NUMERIC

## 2023-07-13 PROCEDURE — 99214 OFFICE O/P EST MOD 30 MIN: CPT | Performed by: NURSE PRACTITIONER

## 2023-07-13 PROCEDURE — 3078F DIAST BP <80 MM HG: CPT | Performed by: NURSE PRACTITIONER

## 2023-07-13 PROCEDURE — 82947 ASSAY GLUCOSE BLOOD QUANT: CPT | Performed by: NURSE PRACTITIONER

## 2023-07-13 PROCEDURE — 3074F SYST BP LT 130 MM HG: CPT | Performed by: NURSE PRACTITIONER

## 2023-07-13 RX ORDER — BLOOD-GLUCOSE,RECEIVER,CONT
1 EACH MISCELLANEOUS ONCE
Qty: 1 EACH | Refills: 0 | Status: SHIPPED | OUTPATIENT
Start: 2023-07-13 | End: 2023-07-13

## 2023-07-13 RX ORDER — DULAGLUTIDE 0.75 MG/.5ML
0.75 INJECTION, SOLUTION SUBCUTANEOUS WEEKLY
Qty: 6 ML | Refills: 0 | Status: SHIPPED | OUTPATIENT
Start: 2023-07-13

## 2023-07-13 RX ORDER — BLOOD-GLUCOSE SENSOR
1 EACH MISCELLANEOUS
Qty: 3 EACH | Refills: 11 | Status: SHIPPED | OUTPATIENT
Start: 2023-07-13

## 2023-07-14 ENCOUNTER — TELEPHONE (OUTPATIENT)
Dept: ENDOCRINOLOGY CLINIC | Facility: CLINIC | Age: 33
End: 2023-07-14

## 2023-07-14 NOTE — TELEPHONE ENCOUNTER
Received PA requests for Dexcom G7 sensor. Filled out form for CGM PA request through [employer]. Faxed to [phone] with a copy of latest OV notes.

## 2023-07-17 NOTE — TELEPHONE ENCOUNTER
Received fax from Michelle Bosch stating that patient is approved for coverage from 4/15/23 through 7/14/24. Messaged pt via [facility]. Sent to scan.

## 2023-07-20 ENCOUNTER — NURSE ONLY (OUTPATIENT)
Dept: ENDOCRINOLOGY CLINIC | Facility: CLINIC | Age: 33
End: 2023-07-20
Payer: MEDICAID

## 2023-07-20 VITALS — WEIGHT: 238 LBS | BODY MASS INDEX: 44 KG/M2

## 2023-07-20 DIAGNOSIS — E11.65 TYPE 2 DIABETES MELLITUS WITH HYPERGLYCEMIA, WITHOUT LONG-TERM CURRENT USE OF INSULIN (HCC): Primary | ICD-10-CM

## 2023-07-20 PROCEDURE — 95249 CONT GLUC MNTR PT PROV EQP: CPT | Performed by: DIETITIAN, REGISTERED

## 2023-07-20 NOTE — PROGRESS NOTES
Thu Beauchamp  [facility]/ was seen for Personal Continuous Glucose Monitoring Training/Start:    Date: 2023  Referring Provider: LLUVIA Walker  Start time: 12:40pm End time: 1:40pm    Sensor Type:Dexcom [address] [facility]    Patient verbalized understanding of the followin. Sensor is worn for 10 days with a 10 hr marielena period  2. Test blood glucose if symptoms do not match sensor reading. 3. How to choose and rotate sensor sites/ alternative sites. 4. Provides high/low alert w/ multiple vibrate/sound options; fixed alert if BG falls below 55 mg/dL  5. /phone needs to be within 20 ft of transmitter to receive data  6. How to handle MRI/Cat scans  7. Waterproof  8. Ability to use phone as ;pt.'d phone wasn't compatible so is using the   9 . Provided w/[employer]ex[employer]  & tech support contact information       Patient asked if I could to insert ;described steps I was taking and sensor started successfully. Patient placed sensor: back of left arm    Sensor Settings:  High Alarm: 250 mg/dL     Low Alarm: 70 mg/dL      Current Oral Medication:  Metformin  mg daily    Current Insulin:  Lantus 25 units daily  Humalog U-100 5 units 3 times daily before meals    GLP-1: Trulicity 3.79 mg weekly  Plan:  Follow-up w/EDMAR Clark on 23 for MNT  Follow up w/LLUVIA Yusuf on 23      Written materials were provided for all the content areas covered. Patient verbalized understanding and has no further questions at this time.       Alba Nur RN,[facility]

## 2023-07-31 ENCOUNTER — DIABETIC EDUCATION (OUTPATIENT)
Dept: ENDOCRINOLOGY CLINIC | Facility: CLINIC | Age: 33
End: 2023-07-31
Payer: MEDICAID

## 2023-07-31 VITALS — HEIGHT: 62 IN | BODY MASS INDEX: 44.35 KG/M2 | WEIGHT: 241 LBS

## 2023-07-31 DIAGNOSIS — E11.65 TYPE 2 DIABETES MELLITUS WITH HYPERGLYCEMIA, WITH LONG-TERM CURRENT USE OF INSULIN (HCC): Primary | ICD-10-CM

## 2023-07-31 DIAGNOSIS — Z79.4 TYPE 2 DIABETES MELLITUS WITH HYPERGLYCEMIA, WITH LONG-TERM CURRENT USE OF INSULIN (HCC): Primary | ICD-10-CM

## 2023-07-31 NOTE — PATIENT INSTRUCTIONS
Continue with your same doses for your Metformin, Lantus, and Trulicity. Start increasing your Humalog dose to 9 units with lunch and dinner. Continue with Humalog 8 units with breakfast.      Always test your blood sugar if you feel any symptoms of a low blood sugar. If you have a low blood sugar (less than 70 mg/dL) follow the \"Rule of 15\" to treat it:  1.) Take 15 grams of a quick acting carbohydrate (3-4 glucose tablets, 1/2 cup fruit juice, 1/2 can regular soda, or 5-6 hard candies). 2.) Then test your blood sugar again after 15 minutes of drinking or eating the quick acting carbohydrate to be sure your blood sugar is above 70 mg/dL. Contact our office between visits if your blood sugars are frequently less than 70 of greater than 250. Follow up through completing the Diabetes Step Classes. Bring your completed Goal Sheet with you for Diabetes Step Class 2.

## 2023-08-17 ENCOUNTER — TELEPHONE (OUTPATIENT)
Dept: ENDOCRINOLOGY CLINIC | Facility: CLINIC | Age: 33
End: 2023-08-17

## 2023-08-17 NOTE — TELEPHONE ENCOUNTER
Tried to call both pt and spouse, didn't get an answer and couldn't get to answering machine. Sent [facility] to reschedule.

## 2023-08-18 ENCOUNTER — OFFICE VISIT (OUTPATIENT)
Dept: ENDOCRINOLOGY CLINIC | Facility: CLINIC | Age: 33
End: 2023-08-18
Payer: MEDICAID

## 2023-08-18 VITALS
RESPIRATION RATE: 20 BRPM | WEIGHT: 239 LBS | OXYGEN SATURATION: 98 % | SYSTOLIC BLOOD PRESSURE: 106 MMHG | DIASTOLIC BLOOD PRESSURE: 70 MMHG | HEART RATE: 104 BPM | BODY MASS INDEX: 44 KG/M2

## 2023-08-18 DIAGNOSIS — E11.65 TYPE 2 DIABETES MELLITUS WITH HYPERGLYCEMIA, WITH LONG-TERM CURRENT USE OF INSULIN (HCC): Primary | ICD-10-CM

## 2023-08-18 DIAGNOSIS — Z79.4 TYPE 2 DIABETES MELLITUS WITH HYPERGLYCEMIA, WITH LONG-TERM CURRENT USE OF INSULIN (HCC): Primary | ICD-10-CM

## 2023-08-18 PROCEDURE — 3078F DIAST BP <80 MM HG: CPT | Performed by: NURSE PRACTITIONER

## 2023-08-18 PROCEDURE — 99214 OFFICE O/P EST MOD 30 MIN: CPT | Performed by: NURSE PRACTITIONER

## 2023-08-18 PROCEDURE — 3074F SYST BP LT 130 MM HG: CPT | Performed by: NURSE PRACTITIONER

## 2023-08-18 RX ORDER — DULAGLUTIDE 1.5 MG/.5ML
1.5 INJECTION, SOLUTION SUBCUTANEOUS WEEKLY
Qty: 2 ML | Refills: 2 | Status: SHIPPED | OUTPATIENT
Start: 2023-08-18

## 2023-08-18 NOTE — TELEPHONE ENCOUNTER
Future Appointments   Date Time Provider Derek Jami   8/18/2023 11:45 AM Enrique Gutiérrez, APRN [facility]   8/22/2023  5:30 PM [facility]   9/12/2023  5:30 PM [facility] [place] [facility]   10/10/2023  5:30 PM [facility] [facility]   10/24/2023  5:30 PM [facility]   12/12/2023  5:30 PM [facility]

## 2023-08-22 ENCOUNTER — NURSE ONLY (OUTPATIENT)
Dept: ENDOCRINOLOGY CLINIC | Facility: CLINIC | Age: 33
End: 2023-08-22
Payer: MEDICAID

## 2023-08-22 DIAGNOSIS — E11.65 TYPE 2 DIABETES MELLITUS WITH HYPERGLYCEMIA, WITHOUT LONG-TERM CURRENT USE OF INSULIN (HCC): Primary | ICD-10-CM

## 2023-08-22 PROCEDURE — G0109 DIAB MANAGE TRN IND/GROUP: HCPCS | Performed by: DIETITIAN, REGISTERED

## 2023-08-23 NOTE — PROGRESS NOTES
Dragan Cardona  [id number] attended Step 2 Group Class: Pathophysiology of Diabetes, Types of Diabetes, Sources of Carbohydrate, Blood Glucose Targets, Medications    Date: 8/22/2023  Referring Provider: Dr. Damian Dumont  Start time: 5:30pm End time: 7pm    The patient participated during the class: The patient was able to answer questions appropriately about treatments for type 2 diabetes. Healthy Eating   Identify sources of carbohydrate  Review technology options for assistance in determining carbohydrate and caloric content of foods     Being Active   Introduce the benefits and precautions of regular physical activity    Monitoring   Review blood glucose targets, Hemoglobin A1C   Introduce Continuous Glucose Monitoring Systems    Taking Medication   Discuss types of diabetes medications: methods of action, positive and negative aspects of each one, side effects including insulin     Problem Solving   Discuss risk factors for type 2 diabetes  Identify habits, myths and behavior changes affecting eating patterns and choices  Discuss how tracking calories and carbohydrate intake affects blood glucose and weigh reduction    Reducing Risks    Discuss progress with goals initiated in Step 1 individual session  Discuss signs/symptoms and treatment of hypoglycemia    Healthy Coping  During introductions, discuss how having diabetes and getting the diagnosis affects individuals with diabetes    The patient verbalized understanding and has no further questions at this time. Written materials provided for all areas covered.   Recommendation: attend Step 3 Class    Joleen See RN, [facility]

## 2023-08-24 DIAGNOSIS — Z79.4 TYPE 2 DIABETES MELLITUS WITH HYPERGLYCEMIA, WITH LONG-TERM CURRENT USE OF INSULIN (HCC): ICD-10-CM

## 2023-08-24 DIAGNOSIS — E11.65 TYPE 2 DIABETES MELLITUS WITH HYPERGLYCEMIA, WITH LONG-TERM CURRENT USE OF INSULIN (HCC): ICD-10-CM

## 2023-08-25 RX ORDER — INSULIN LISPRO 100 [IU]/ML
5 INJECTION, SOLUTION INTRAVENOUS; SUBCUTANEOUS
Qty: 30 ML | Refills: 1 | Status: SHIPPED | OUTPATIENT
Start: 2023-08-25

## 2023-08-25 NOTE — TELEPHONE ENCOUNTER
Requested Prescriptions     Pending Prescriptions Disp Refills    Insulin Lispro, 1 Unit Dial, (HUMALOG KWIKPEN) 100 UNIT/ML Subcutaneous Solution Pen-injector 30 mL 1     Sig: Inject 5 Units into the skin 3 (three) times daily before meals. Your appointments       Date & Time Appointment Department [facility])    Sep 12, 2023  5:30 PM CDT DIAB STEP 3 CLASS with [facility] [address] ([address] DIABETES [place])    TOPICS DISCUSSED IN CLASS/APPOINTMENT  Carbohydrate Counting  Medications  Treating Highs/Lows    FAMILY INVOLVEMENT  Since this is an adult class, please make arrangements for someone to care for your young children. ARRIVAL TIME  Please arrive 5 minutes prior to appointment time. INSURANCE QUESTIONS  Please bring your insurance card, 's license/ID, the order for education from your doctor, any pertinent lab results and a list of medications patient is taking. Should you have questions about insurance coverage, call the [phone] on the back of your insurance card. BILLING CODES  These classes are billed through [address] is the billing code. WHO TO CONTACT TO CHANGE APPOINTMENT  If you need to make changes to your appointment or have questions, please call the Diabetes Center at [phone]. Oct 10, 2023  5:30 PM CDT DIAB STEP 4 CLASS with [facility] [address] ([address] DIABETES [place])    TOPICS DISCUSSED IN CLASS/APPOINTMENT  Reducing Complications, Special Occasion Eating, Depression/ Stress and Diabetes    FAMILY INVOLVEMENT  You are welcome to bring an adult friend or family member  to the class. Since this is an adult class, please make arrangements for someone to care for your young children. ARRIVAL TIME  Please arrive 5 minutes prior to appointment time.     INSURANCE QUESTIONS  Please bring your insurance card, 's license/ID, the order for education from your doctor, any pertinent lab results and a list of medications patient is taking. Should you have questions about insurance coverage, call the [phone] on the back of your insurance card. BILLING CODES  These classes are billed through [address] is the billing code. WHO TO CONTACT TO CHANGE APPOINTMENT  If you need to make changes to your appointment or have questions, please call the Diabetes Center at [phone]. Oct 20, 2023  3:45 PM CDT Diabetes Pump follow up with MAURICE Barrett [facility], [facility] [address] ( [facility])        Oct 24, 2023  5:30 PM CDT DIAB STEP 5 CLASS  with [facility], [address] [place] [address] ([address] DIABETES [place])    TOPICS DISCUSSED IN CLASS/APPOINTMENT  Topics to be covered include: Heart Healthy Eating, Stress Management    FAMILY INVOLVEMENT  You are welcome to bring an adult friend or family member to the class. Since this is an adult class, please make arrangements for someone to care for your young children. ARRIVAL TIME  Please arrive 5 minutes prior to appointment time. INSURANCE QUESTIONS  Please bring your insurance card, 's license/ID, the order for education from your doctor, any pertinent lab results and a list of medications patient is taking. Should you have questions about insurance coverage, call the [phone] on the back of your insurance card. BILLING CODES  These classes are billed through [address] is the billing code. WHO TO CONTACT TO CHANGE APPOINTMENT  If you need to make changes to your appointment or have questions, please call the Diabetes Center at [phone].         Dec 12, 2023  5:30 PM CST DIAB STEP 6 CLASS  with [facility] [address], [address] [place] [address] ([address]) [facility], [address] [place] [address] Ave [address] [phone]          Last A1c value was 10.2% done 6/12/2023.     Refill 6/28/23  LOV 8/18/23

## 2023-09-20 ENCOUNTER — PATIENT MESSAGE (OUTPATIENT)
Dept: ENDOCRINOLOGY CLINIC | Facility: CLINIC | Age: 33
End: 2023-09-20

## 2023-10-05 ENCOUNTER — NURSE ONLY (OUTPATIENT)
Dept: ENDOCRINOLOGY CLINIC | Facility: CLINIC | Age: 33
End: 2023-10-05
Payer: MEDICAID

## 2023-10-05 DIAGNOSIS — Z79.4 TYPE 2 DIABETES MELLITUS WITH HYPERGLYCEMIA, WITH LONG-TERM CURRENT USE OF INSULIN (HCC): Primary | ICD-10-CM

## 2023-10-05 DIAGNOSIS — E11.65 TYPE 2 DIABETES MELLITUS WITH HYPERGLYCEMIA, WITH LONG-TERM CURRENT USE OF INSULIN (HCC): Primary | ICD-10-CM

## 2023-10-05 PROCEDURE — G0109 DIAB MANAGE TRN IND/GROUP: HCPCS

## 2023-10-05 NOTE — PROGRESS NOTES
Markel Roberts : 1990 attended Step 3 Group Diabetes Education Class: Food Groups, Carbohydrate Counting, Label Reading    10/5/2023   Start time: 5:30pm End time: 7pm    Patient verbally consents to a telemedicine service with live, interactive video and audio onTD@. Patient understands and accepts financial responsibility for any deductible, co-insurance and/or co-pays associated with this service. The patient participated during the class: Pt was able to identify sources of carbohydrates, read food labels for carb content of foods. Healthy Eating:  Reviewed Basic Diet Guidelines as foundation of diabetes meal planning. Reinforced the balanced plate method. Taught nutrition basics defining carbohydrate, protein, and fat. Taught label reading, including an option to count carbohydrate grams or servings. Provided patient with goals for carbohydrate grams/choices for meals and snacks. Discussed sugar substitutes, alcohol and effect on blood glucose. Miriam's helpful for smart phones, as well as websites for examining carbohydrate levels provided. Reviewed and reinforced macronutrient's, carbohydrate counting and meal planning. Problem Solving:  Reinforced signs, symptoms, and treatment of hypoglycemia using the Rule of 15. Importance of being aware of potential for a low blood sugar when consuming alcohol  Discussed impact of different food items and portion sizes on blood glucose levels. Discussed blood glucose target of 180 mg/dL, if testing 2 hours post meal.    Monitoring:  Reviewed blood glucose records and importance of tracking foods/blood glucose levels. Reinforced the importance of taking medications as prescribed. Behavior Change:  Reviewed and updated individual goals and action plan set by patient.    Addressed barriers to tracking foods/blood glucose levels and following guidelines presented/achieving goals, and setting SMART goals as a group discussion. Recommendations: Follow individual meal plan recommended by Educator (Divine Hernandez). Continue implementing individual goals. Attend remaining class sessions. Begin implementing carbohydrate counting and continue dietary and blood glucose tracking. Written materials provided for all areas covered. Patient verbalized understanding and has no further questions currently.         Landy Sauceda RDN, SANNAN, Divine Hernandez  Certified Diabetes Care and   Registered Dietitian

## 2023-10-15 DIAGNOSIS — Z79.4 TYPE 2 DIABETES MELLITUS WITH HYPERGLYCEMIA, WITH LONG-TERM CURRENT USE OF INSULIN (HCC): ICD-10-CM

## 2023-10-15 DIAGNOSIS — E11.65 TYPE 2 DIABETES MELLITUS WITH HYPERGLYCEMIA, WITH LONG-TERM CURRENT USE OF INSULIN (HCC): ICD-10-CM

## 2023-10-16 RX ORDER — INSULIN GLARGINE 100 [IU]/ML
25 INJECTION, SOLUTION SUBCUTANEOUS EVERY MORNING
Qty: 15 ML | Refills: 1 | Status: SHIPPED | OUTPATIENT
Start: 2023-10-16

## 2023-10-16 NOTE — TELEPHONE ENCOUNTER
Requested Prescriptions     Pending Prescriptions Disp Refills    LANTUS SOLOSTAR 100 UNIT/ML Subcutaneous Solution Pen-injector [Pharmacy Med Name: LANTUS SOLOSTAR PEN INJ 3ML] 15 mL 1     Sig: ADMINISTER 25 UNITS UNDER THE SKIN EVERY MORNING     Your appointments       Date & Time Appointment Department [facility])    Oct 20, 2023  3:45 PM CDT Diabetes Pump follow up with MAURICE [facility]ett [facility], [address] [place] [address] ([address] DIABETES [place] Vera)        Oct 24, 2023  5:30 PM CDT DIAB STEP 5 CLASS  with [facility] [address] ([address] DIABETES [place])    TOPICS DISCUSSED IN CLASS/APPOINTMENT  Topics to be covered include: Heart Healthy Eating, Stress Management    FAMILY INVOLVEMENT  You are welcome to bring an adult friend or family member to the class. Since this is an adult class, please make arrangements for someone to care for your young children. ARRIVAL TIME  Please arrive 5 minutes prior to appointment time. INSURANCE QUESTIONS  Please bring your insurance card, 's license/ID, the order for education from your doctor, any pertinent lab results and a list of medications patient is taking. Should you have questions about insurance coverage, call the [phone] on the back of your insurance card. BILLING CODES  These classes are billed through [address] is the billing code. WHO TO CONTACT TO CHANGE APPOINTMENT  If you need to make changes to your appointment or have questions, please call the Diabetes Center at [phone].         Dec 12, 2023  5:30 PM CST DIAB STEP 6 CLASS  with [facility] [address] ([address] DIABETES [place])              [address]  [facility] DIABET[facility] [place]  [address] [address] [phone]          Last A1c value was 10.2% done 6/12/2023.     Refill 6/28/23  LOV 8/18/23

## 2023-10-20 ENCOUNTER — OFFICE VISIT (OUTPATIENT)
Dept: ENDOCRINOLOGY CLINIC | Facility: CLINIC | Age: 33
End: 2023-10-20
Payer: MEDICAID

## 2023-10-20 VITALS
OXYGEN SATURATION: 98 % | DIASTOLIC BLOOD PRESSURE: 64 MMHG | HEART RATE: 83 BPM | WEIGHT: 232.38 LBS | BODY MASS INDEX: 43 KG/M2 | RESPIRATION RATE: 20 BRPM | SYSTOLIC BLOOD PRESSURE: 108 MMHG

## 2023-10-20 DIAGNOSIS — Z79.4 TYPE 2 DIABETES MELLITUS WITHOUT COMPLICATION, WITH LONG-TERM CURRENT USE OF INSULIN (HCC): Primary | ICD-10-CM

## 2023-10-20 DIAGNOSIS — E11.9 TYPE 2 DIABETES MELLITUS WITHOUT COMPLICATION, WITH LONG-TERM CURRENT USE OF INSULIN (HCC): Primary | ICD-10-CM

## 2023-10-20 LAB
CARTRIDGE LOT#: 612 NUMERIC
CREAT UR-SCNC: 223 MG/DL
HEMOGLOBIN A1C: 6 % (ref 4.3–5.6)
MICROALBUMIN UR-MCNC: 2.03 MG/DL
MICROALBUMIN/CREAT 24H UR-RTO: 9.1 UG/MG (ref ?–30)

## 2023-10-20 PROCEDURE — 82570 ASSAY OF URINE CREATININE: CPT | Performed by: NURSE PRACTITIONER

## 2023-10-20 PROCEDURE — 83036 HEMOGLOBIN GLYCOSYLATED A1C: CPT | Performed by: NURSE PRACTITIONER

## 2023-10-20 PROCEDURE — 3078F DIAST BP <80 MM HG: CPT | Performed by: NURSE PRACTITIONER

## 2023-10-20 PROCEDURE — 3074F SYST BP LT 130 MM HG: CPT | Performed by: NURSE PRACTITIONER

## 2023-10-20 PROCEDURE — 99214 OFFICE O/P EST MOD 30 MIN: CPT | Performed by: NURSE PRACTITIONER

## 2023-10-20 PROCEDURE — 95251 CONT GLUC MNTR ANALYSIS I&R: CPT | Performed by: NURSE PRACTITIONER

## 2023-10-20 PROCEDURE — 82043 UR ALBUMIN QUANTITATIVE: CPT | Performed by: NURSE PRACTITIONER

## 2023-10-20 RX ORDER — DULAGLUTIDE 3 MG/.5ML
3 INJECTION, SOLUTION SUBCUTANEOUS WEEKLY
Qty: 2 ML | Refills: 5 | Status: SHIPPED | OUTPATIENT
Start: 2023-10-20

## 2024-01-05 ENCOUNTER — OFFICE VISIT (OUTPATIENT)
Dept: ENDOCRINOLOGY CLINIC | Facility: CLINIC | Age: 34
End: 2024-01-05
Payer: MEDICAID

## 2024-01-05 VITALS
RESPIRATION RATE: 20 BRPM | WEIGHT: 230 LBS | HEART RATE: 83 BPM | OXYGEN SATURATION: 97 % | DIASTOLIC BLOOD PRESSURE: 58 MMHG | BODY MASS INDEX: 42 KG/M2 | SYSTOLIC BLOOD PRESSURE: 118 MMHG

## 2024-01-05 DIAGNOSIS — Z79.4 TYPE 2 DIABETES MELLITUS WITHOUT COMPLICATION, WITH LONG-TERM CURRENT USE OF INSULIN (HCC): Primary | ICD-10-CM

## 2024-01-05 DIAGNOSIS — E11.9 TYPE 2 DIABETES MELLITUS WITHOUT COMPLICATION, WITH LONG-TERM CURRENT USE OF INSULIN (HCC): Primary | ICD-10-CM

## 2024-01-05 LAB
CARTRIDGE LOT#: 114 NUMERIC
CREAT UR-SCNC: 232 MG/DL
HEMOGLOBIN A1C: 6.3 % (ref 4.3–5.6)
MICROALBUMIN UR-MCNC: 2.63 MG/DL
MICROALBUMIN/CREAT 24H UR-RTO: 11.3 UG/MG (ref ?–30)

## 2024-01-05 PROCEDURE — 99214 OFFICE O/P EST MOD 30 MIN: CPT | Performed by: NURSE PRACTITIONER

## 2024-01-05 PROCEDURE — 3078F DIAST BP <80 MM HG: CPT | Performed by: NURSE PRACTITIONER

## 2024-01-05 PROCEDURE — 82570 ASSAY OF URINE CREATININE: CPT | Performed by: NURSE PRACTITIONER

## 2024-01-05 PROCEDURE — 95251 CONT GLUC MNTR ANALYSIS I&R: CPT | Performed by: NURSE PRACTITIONER

## 2024-01-05 PROCEDURE — 3074F SYST BP LT 130 MM HG: CPT | Performed by: NURSE PRACTITIONER

## 2024-01-05 PROCEDURE — 83036 HEMOGLOBIN GLYCOSYLATED A1C: CPT | Performed by: NURSE PRACTITIONER

## 2024-01-05 PROCEDURE — 82043 UR ALBUMIN QUANTITATIVE: CPT | Performed by: NURSE PRACTITIONER

## 2024-01-05 RX ORDER — ACYCLOVIR 400 MG/1
1 TABLET ORAL
Qty: 3 EACH | Refills: 5 | Status: SHIPPED | OUTPATIENT
Start: 2024-01-05

## 2024-01-05 NOTE — PATIENT INSTRUCTIONS
Summary from visit:   Last A1c value was 6.3% done 2024.    Diabetes Medications:   Continue:   Metformin  m tab daily with breakfast  Trulicity 3 mg weekly     Change:  Start Jardiance 10 mg daily in am  Stop Lantus for now    You have been prescribed a class of medication known as SGLT2 (sodium-glucose cotransporter 2 inhibitor). This medication will remove extra sugar out of blood into your urine. This medication has a positive effect on blood pressure, weight and most importantly blood sugars.     While taking this medication please remember:     It can be dosed anytime of day but morning is probably best time to take it due to increase in urination effect.  2. Please drink at least 4 glasses of water daily to avoid dehydration.   3. It does not cause low blood sugars (hypoglycemia). If you develop any low blood sugars, we will need to adjust other medications.   4. Please call me if you develop any symptoms of urinary tract infection or yeast infection.   5. A potential side effect of euglycemic diabetic ketoacidosis can occur in Type 1 Diabetes patients on this medication or in Type 2 Diabetes patients following a very low carb diet.   When you are eating lower than 45 gm CHO per day, you are placing yourself in ketosis mode; burning fat down for energy. A very-low-carbohydrate or ketogenic diet is not recommended in combination with an SGLT2 inhibitor as this could increase the production of ketones, particularly during times of illness, dehydration, and/or metabolic stress, leading to diabetes ketoacidosis.            In order for me to determine any patterns in your blood sugars, you will need to test your blood sugar 1-2 times daily if not on dexcom.    Call if glucose increases significantly off insulin (staying over 200 mg/dl).   Please complete labs ASAP and schedule diabetic eye exam prior to your next visit.   Return in about 3 months (around 2024).     Remember to bring your  glucose meter or blood sugar logbook to every appointment here at the diabetes center. This allows me to safely make adjustments to your diabetes plan.   It is important to take all of your medications as prescribed. Please call me if you cannot get the prescriptions filled or are having issues with refills.   Also, please call me if you have any issues with medication questions, side effects, dosing questions or problems with your blood sugar trends BEFORE CHANGING OR STOPPING ANY MEDICATIONS.  ---------------------------------------------------------------------------------------------------------------------------------------------------    Reminders:  The A1C:  The A1C test provides us with your average blood sugar for the past 3 months. Keeping an A1C less than 7% helps reduce or delay health problems that are related to diabetes.   The main goal of diabetes treatment is to keep your sugar from going too high to prevent or delay complications from the disease as well as maintain your quality of life.   For most people the target for A1C is less than 7.0% but sometimes we make exceptions based on age, health history and other factors.     Blood sugar targets:  It would be best to change up the times of day that you are testing your sugar.   Recommended times to test: Before breakfast (fasting) and then alternate testing blood sugar 2 hours after your meals.   Before breakfast:   (preferably less than 110)  2 hours After meals: less than 180 (preferably less than 150)   Call for persistent blood sugars less than  75 or more than  200.   Blood sugars greater than 200 are not acceptable to reach your goal of improving diabetes      Hypoglycemia:    Watch for low blood sugars: (less than 70)  Symptoms of low blood sugar:   Shakiness or dizziness  Cold, clammy skin or sweating  Feeling hungry  Headache  Nervousness  A hard, fast heartbeat  Weakness  Confusion or irritability  Blurred eyesight  Having nightmares  or waking up confused or sweating  Numbness or tingling in the lips or tongue    Treatment of Low Blood sugar Action Plan  1. Check blood glucose to be sure that it is low. You can’t always go by symptoms. If in doubt, treat your low blood glucose anyway.  2. Take 15 grams of carbohydrate (carb). Here are some choices:  4 oz. regular fruit juice  3-4 glucose tablets  6 oz. regular soda   7-8 jelly beans  3. Recheck blood glucose after 10-15 minutes. If blood glucose is still low (less than 70 mg/dl) repeat the treatment (step 2).  4. If your next meal is more than one hour away, eat a small snack.  5. If you’re not sure what caused your low blood glucose, call your healthcare provider.  6. Always check your blood glucose before you drive       Diabetes Testin. LABS: It is important to monitor your kidney function (blood and urine protein levels) , liver function tests and cholesterol levels when you have diabetes. If your primary care provider has not ordered these tests, I will order them for you.   2. FOOT exams:  daily foot inspections for foot wounds or skin changes are important for foot care. Any unusual changes should be reported immediately.  3. EYE exams: Checking your eyes for diabetes changes is important and you should have a dilated eye exam done by an eye doctor EVERY year since these changes occur in the BACK of the eye and not visible by you.         Diabetes Center Refill policy:     Allow 2-3 business days for refills  Contact your pharmacy at least 5 days prior to running out of medication and have them send an electronic request or submit request through the “request refill” option in your [employer] account.  Refills are not addressed after hours or on weekends; covering providers  do not authorize routine medications on weekends.  If your prescription is due for a refill, you may be due for a follow up appointment. This may impact the ability for you to get a 90 supply if requested.   To  best provide you care, patients receiving routine medications need to be seen at least twice per year however if the A1C is above 8% you will be need to be seen more frequently.   Yearly blood work may also required for many medications to insure safe prescribing. If you are due for labs, you will have 30 days to complete the  requested labs before future refills are authorized.   In the event that your preferred pharmacy does not have the requested medication in stock (e.g. Backordered), it is your responsibility to find another pharmacy that has the requested medication available.  We will gladly send a new prescription to that pharmacy at your request.       More and more people are living long and healthy lives with diabetes. We are here to help you manage your diabetes. Let’s work together to make a plan that you can balance in your daily life. Please continue with your primary care physician/provider for your routine health care maintenance.   Thank you,   Cecilia Knight [facility]

## 2024-01-05 NOTE — PROGRESS NOTES
Chief Complaint   Patient presents with    Diabetes     F/u- dexcom     HPI:   Marilynn Jasso is a 33 year old female who presents for a follow up visit for management of diabetes. Last A1c value was 6.3% done 2024.  Since last visit diabetes management has been stable and well controlled.      Patient is using continuous glucose monitoring or would be testing BGs at least 4 times per day.  Patient is on insulin injections.   Patient is making self-adjustments in insulin according to blood sugars.    Diabetes history:  Type: 2  Onset: 2023  Pt has been admitted to the hospital for blood sugars.   Pt does not have a hx of pancreatitis.   Initial consult date: 23    Current DM regimen:  Metformin  m tab daily with breakfast  Trulicity 3 mg weekly   Lantus 18 units daily injection      Hypoglycemia Tx:  Rule of 15    Previous DM therapies:  Humalog - improved glucose control     Complications/Co-morbidities:   None       Modifying factors:  Medication adherence: yes   Recent illness/steroids: none since last visit  Barriers: None identified     Overall glucose control:   HGBA1C:    Lab Results   Component Value Date    A1C 6.3 (A) 2024    A1C 6.0 (A) 10/20/2023    A1C 10.2 (H) 2023     (H) 2023     Personal  Dexcom G6 CGM not streaming   Analysis of data: 23-24  Active wear time: 100% (Goal 70%)  Sensor download: full report  in media  Average glucose : 148 mg/dl   GMI: 6.9%     CV (coefficient of variation) : 21.3%      15% (last visit 6%) time above 180mg /dl (Goal < 25%)  <1% (last visit 0%) time above 250 mg/dl (Goal < 5%)  84% (last visit 94%) time in target range:  mg/dl (Goal > 70%)  <1% (last visit 0%) time below target range: 70mg/dl (Goal < 4%)     Evaluation   1. Baseline glucose in target range  2. occasional postprandial elevation with return to baseline  3. low frequency of hypoglycemia         Past History:   She  has no past medical history on  file.   Her family history is not on file.   She  reports that she has never smoked. She has never used smokeless tobacco. She reports that she does not drink alcohol and does not use drugs.     She is allergic to ketorolac.     Current Outpatient Medications on File Prior to Visit   Medication Sig    Dulaglutide (TRULICITY) 3 MG/0.5ML Subcutaneous Solution Pen-injector Inject 3 mg into the skin once a week.    insulin glargine (LANTUS SOLOSTAR) 100 UNIT/ML Subcutaneous Solution Pen-injector Inject 25 Units into the skin every morning.    metFORMIN  MG Oral Tablet 24 Hr Take 1 tablet (750 mg total) by mouth daily.    Insulin Pen Needle (BD PEN NEEDLE BERENICE U/F) 32G X 4 MM Does not apply Misc 4 times daily    Blood Glucose Monitoring Suppl (CONTOUR NEXT EZ) w/Device Does not apply Kit 1 kit 3 (three) times daily before meals. Test blood glucose 3-4 times before meals and for signs/symptoms of hypoglycemia    Glucose Blood (CONTOUR NEXT TEST) In Vitro Strip Test blood glucose 3-4 times before meals and for signs/symptoms of hypoglycemia    [DISCONTINUED] Continuous Blood Gluc Sensor (DEXCOM G7 SENSOR) Does not apply Misc 1 each Every 10 days.    Microlet Lancets (LEXUS MICROLET LANCETS) Does not apply Misc Test blood glucose 3-4 times before meals and for signs/symptoms of hypoglycemia     No current facility-administered medications on file prior to visit.       BP Readings from Last 3 Encounters:   01/05/24 118/58   10/20/23 108/64   08/18/23 106/70     Wt Readings from Last 3 Encounters:   01/05/24 230 lb (104.3 kg)   10/20/23 232 lb 6.4 oz (105.4 kg)   08/18/23 239 lb (108.4 kg)     CMP  (most recent labs)   Lab Results   Component Value Date/Time     (H) 06/13/2023 07:05 AM    BUN 8 06/13/2023 07:05 AM    CREATSERUM 0.47 (L) 06/13/2023 07:05 AM    GFRNAA 84 11/29/2018 09:44 AM    GFRAA 97 11/29/2018 09:44 AM    EGFRCR 128 06/13/2023 07:05 AM    CA 8.1 (L) 06/13/2023 07:05 AM    ALKPHO 89 06/12/2023  02:12 PM    AST 33 06/12/2023 03:13 PM    ALT 74 (H) 06/12/2023 02:12 PM    BILT 0.5 06/12/2023 02:12 PM    TP 7.8 06/12/2023 02:12 PM    ALB 2.9 (L) 06/12/2023 02:12 PM     06/13/2023 07:05 AM    K 3.9 06/13/2023 07:05 AM     06/13/2023 07:05 AM    CO2 23.0 06/13/2023 07:05 AM            Lipids  (most recent labs)   No results found for: \"CHOLEST\", \"TRIG\", \"HDL\", \"LDL\", \"NONHDLC\"      Thyroid  (most recent labs)   No results found for: \"TSH\", \"T4F\"     Micro Albumen/Creatinine:    Lab Results   Component Value Date    MICROALBCREA 9.1 10/20/2023          Lab results reviewed with patient.    REVIEW OF SYSTEMS:   Review of Systems  GENERAL HEALTH: feels well otherwise  SKIN: denies any unusual skin lesions or rashes  EYES: denies vision changes  RESPIRATORY: denies shortness of breath with exertion  CARDIOVASCULAR: denies chest pain on exertion  GI: denies abdominal pain, N/V/D  NEURO: denies headaches and reports numbness and tingling to finger tips    EXAM:   /58   Pulse 83   Resp 20   Wt 230 lb (104.3 kg)   LMP 07/12/2023 (Approximate)   SpO2 97%   BMI 42.07 kg/m²  Estimated body mass index is 42.07 kg/m² as calculated from the following:    Height as of 7/31/23: 5' 2\" (1.575 m).    Weight as of this encounter: 230 lb (104.3 kg).   Physical Exam  Vitals reviewed  Constitutional: Normal appearance, no acute distress  Pulmonary: Pulmonary effort is normal  Neurologic: Alert and oriented  Psychiatric: Normal mood and affect  Musculoskeletal:  Diabetes foot exam:   Good foot hygiene.   Bilateral barefoot skin diabetic exam: normal.  Visualized feet and the appearance : normal.  Bilateral monofilament/sensation of both feet is normal. Vibration to dorsum to the first toe perceived.   Bilateral 2+ pedal pulse on exam     ASSESSMENT AND PLAN:   As for her Diabetes, it is well controlled. Discussed coming off insulin and adding SGLT2i. Pt agreeable. Discussed the risk of and benefits of SGLT2  inhibitor class for treatment of type 2 Diabetes. This class of med results in the renal excretion of glucose and can lower blood sugars.   EGFR > 25    Patient was instructed to drink at least 4 eight ounce glasses of water daily to avoid dehydration.  Call if any rash, itching in genital area or painful urination develops      Pt to call if glucose rises significantly (staying over 200 mg/dl) off insulin. Once insulin is fully discontinued discussed insurance will likely not pay for dexcom any more. Pt verbalizes understanding.     Medications:  Continue:   Metformin  m tab daily with breakfast  Trulicity 3 mg weekly     Change:  Start Jardiance 10 mg daily in am  Stop Lantus for now      Recommendations:   Continue dexcom CGM - once insurance isn't covering resume BGM 1-2x daily  lose weight by increased dietary compliance and exercise   Reminded to get annual retinal exam  check feet daily  check labs as ordered - reminded once again to complete, will discuss statin once labs received       Cardiovascular:  The ASCVD Risk score (Hung NEVAREZ, et al., 2019) failed to calculate for the following reasons:    The 2019 ASCVD risk score is only valid for ages 40 to 79     As for her hypertension, Blood Pressure is well controlled. Pt is not on ace/arb.   PLAN: reviewed diet, exercise and weight control, and labs as ordered     As for her cholesterol, Lipids are control uncertain. Pt is not on statin.   PLAN: reviewed diet, exercise and weight control, and labs as ordered    Patient is not on aspirin therapy.     DM Health Maintenance  Nephropathy screening: check labs  Last dilated eye exam: No data recorded Exam shows retinopathy? No data recorded  Last diabetic foot exam: Last Foot Exam: 24    Date of last PHQ-2 depression screen: PHQ-2 - Date of last depression screenin2023  Patient  reports that she has never smoked. She has never used smokeless tobacco.  When is flu vaccine due? Influenza  Vaccine(1) Never done  When is pneumonia vaccine due? Pneumococcal Vaccination(1 - PCV) Never done  Dentist : recommend every 6m     The patient indicates understanding of these issues and agrees to the plan.  Refills sent at time of office visit.    Diagnoses and all orders for this visit:    Type 2 diabetes mellitus without complication, with long-term current use of insulin (HCC)  -     POC Hgb A1C  -     Microalb/Creat Ratio, Random Urine [E]; Future  -     Comp Metabolic Panel (14) [E]; Future  -     Lipid Panel [E]; Future  -     empagliflozin (JARDIANCE) 10 MG Oral Tab; Take 1 tablet (10 mg total) by mouth daily.  -     Continuous Blood Gluc Sensor (DEXCOM G7 SENSOR) Does not apply Misc; 1 each Every 10 days.  -     Refer to Opthalmology         Do the chronicity and potential for complications of disease pt will need ongoing monitoring.   Return in about 3 months (around 4/5/2024).    Spent 30 min obtaining patient history, evaluating patient, reviewing blood glucose trends, discussing treatment options, lifestyle modifications and completing documentation -this time does not including sensor interpretation time if applicable.   The risks and benefits of my recommendations, as well as other treatment options were discussed with the patient today. Questions were also answered to the best of my knowledge.    Cecilia RICHARDS

## 2024-02-14 ENCOUNTER — LABORATORY ENCOUNTER (OUTPATIENT)
Dept: LAB | Age: 34
End: 2024-02-14
Attending: NURSE PRACTITIONER
Payer: MEDICAID

## 2024-02-14 DIAGNOSIS — Z79.4 TYPE 2 DIABETES MELLITUS WITHOUT COMPLICATION, WITH LONG-TERM CURRENT USE OF INSULIN (HCC): ICD-10-CM

## 2024-02-14 DIAGNOSIS — E11.9 TYPE 2 DIABETES MELLITUS WITHOUT COMPLICATION, WITH LONG-TERM CURRENT USE OF INSULIN (HCC): ICD-10-CM

## 2024-02-14 DIAGNOSIS — Z79.4 TYPE 2 DIABETES MELLITUS WITH HYPERGLYCEMIA, WITH LONG-TERM CURRENT USE OF INSULIN (HCC): ICD-10-CM

## 2024-02-14 DIAGNOSIS — E11.65 TYPE 2 DIABETES MELLITUS WITH HYPERGLYCEMIA, WITH LONG-TERM CURRENT USE OF INSULIN (HCC): ICD-10-CM

## 2024-02-14 LAB
ALBUMIN SERPL-MCNC: 3.6 G/DL (ref 3.4–5)
ALBUMIN/GLOB SERPL: 0.9 {RATIO} (ref 1–2)
ALP LIVER SERPL-CCNC: 90 U/L
ALT SERPL-CCNC: 26 U/L
ANION GAP SERPL CALC-SCNC: 2 MMOL/L (ref 0–18)
AST SERPL-CCNC: 3 U/L (ref 15–37)
BILIRUB SERPL-MCNC: 0.4 MG/DL (ref 0.1–2)
BUN BLD-MCNC: 13 MG/DL (ref 9–23)
CALCIUM BLD-MCNC: 8.9 MG/DL (ref 8.5–10.1)
CHLORIDE SERPL-SCNC: 107 MMOL/L (ref 98–112)
CHOLEST SERPL-MCNC: 201 MG/DL (ref ?–200)
CO2 SERPL-SCNC: 31 MMOL/L (ref 21–32)
CREAT BLD-MCNC: 0.66 MG/DL
EGFRCR SERPLBLD CKD-EPI 2021: 118 ML/MIN/1.73M2 (ref 60–?)
FASTING PATIENT LIPID ANSWER: YES
FASTING STATUS PATIENT QL REPORTED: YES
GLOBULIN PLAS-MCNC: 4.2 G/DL (ref 2.8–4.4)
GLUCOSE BLD-MCNC: 112 MG/DL (ref 70–99)
HDLC SERPL-MCNC: 39 MG/DL (ref 40–59)
LDLC SERPL CALC-MCNC: 132 MG/DL (ref ?–100)
NONHDLC SERPL-MCNC: 162 MG/DL (ref ?–130)
OSMOLALITY SERPL CALC.SUM OF ELEC: 291 MOSM/KG (ref 275–295)
POTASSIUM SERPL-SCNC: 4.1 MMOL/L (ref 3.5–5.1)
PROT SERPL-MCNC: 7.8 G/DL (ref 6.4–8.2)
SODIUM SERPL-SCNC: 140 MMOL/L (ref 136–145)
TRIGL SERPL-MCNC: 167 MG/DL (ref 30–149)
VLDLC SERPL CALC-MCNC: 30 MG/DL (ref 0–30)

## 2024-02-14 PROCEDURE — 36415 COLL VENOUS BLD VENIPUNCTURE: CPT

## 2024-02-14 PROCEDURE — 80053 COMPREHEN METABOLIC PANEL: CPT

## 2024-02-14 PROCEDURE — 80061 LIPID PANEL: CPT

## 2024-03-21 ENCOUNTER — TELEPHONE (OUTPATIENT)
Dept: ENDOCRINOLOGY CLINIC | Facility: CLINIC | Age: 34
End: 2024-03-21

## 2024-03-21 NOTE — TELEPHONE ENCOUNTER
Pt paged provider on call stating pharmacy was trying to get a hold of us.  No messages on nurse line nor anything in right fax.  Contacted pt to see what was needed.  Pharmacy does not have trulicity 3 mg in stock.  Advised patient to check smaller pharmacies for the 3 mg and we can send new script.  If pharmacy has 4.5 mg dose, pt could possibly increase if Ivette is in agreement. She will call us tomorrow or send mychart.

## 2024-03-25 DIAGNOSIS — E11.65 TYPE 2 DIABETES MELLITUS WITH HYPERGLYCEMIA, WITH LONG-TERM CURRENT USE OF INSULIN (HCC): ICD-10-CM

## 2024-03-25 DIAGNOSIS — Z79.4 TYPE 2 DIABETES MELLITUS WITH HYPERGLYCEMIA, WITH LONG-TERM CURRENT USE OF INSULIN (HCC): ICD-10-CM

## 2024-03-25 RX ORDER — INSULIN GLARGINE 100 [IU]/ML
25 INJECTION, SOLUTION SUBCUTANEOUS EVERY MORNING
Qty: 15 ML | Refills: 1 | Status: SHIPPED | OUTPATIENT
Start: 2024-03-25

## 2024-03-25 NOTE — TELEPHONE ENCOUNTER
Received refill requested through [facility] for Lantus   Requested Prescriptions     Pending Prescriptions Disp Refills    insulin glargine (LANTUS SOLOSTAR) 100 UNIT/ML Subcutaneous Solution Pen-injector 15 mL 1     Sig: Inject 25 Units into the skin every morning.     Future Appointments   Date Time Provider Department Center   4/24/2024  9:00 AM Cecilia Knight, APRN [facility]     Last A1c value was 6.3% done 1/5/2024.  Refill 10/19/23   LOV 1/5/24

## 2024-04-22 DIAGNOSIS — Z79.4 TYPE 2 DIABETES MELLITUS WITHOUT COMPLICATION, WITH LONG-TERM CURRENT USE OF INSULIN (HCC): ICD-10-CM

## 2024-04-22 DIAGNOSIS — E11.9 TYPE 2 DIABETES MELLITUS WITHOUT COMPLICATION, WITH LONG-TERM CURRENT USE OF INSULIN (HCC): ICD-10-CM

## 2024-04-22 NOTE — TELEPHONE ENCOUNTER
Requested Prescriptions     Pending Prescriptions Disp Refills    JARDIANCE 10 MG Oral Tab [Pharmacy Med Name: JARDIANCE 10MG TABLETS] 90 tablet 0     Sig: TAKE 1 TABLET(10 MG) BY MOUTH DAILY     Future Appointments   Date Time Provider Department Center   4/24/2024  9:00 AM Cecilia Knight APRN [facility]     Last A1c value was 6.3% done 1/5/2024.  Refill 3/25/24  LOV 1/5/24

## 2024-04-24 ENCOUNTER — OFFICE VISIT (OUTPATIENT)
Dept: ENDOCRINOLOGY CLINIC | Facility: CLINIC | Age: 34
End: 2024-04-24
Payer: MEDICAID

## 2024-04-24 VITALS
DIASTOLIC BLOOD PRESSURE: 78 MMHG | SYSTOLIC BLOOD PRESSURE: 114 MMHG | WEIGHT: 224.38 LBS | HEART RATE: 87 BPM | BODY MASS INDEX: 41 KG/M2 | OXYGEN SATURATION: 99 % | RESPIRATION RATE: 20 BRPM

## 2024-04-24 DIAGNOSIS — E78.5 HYPERLIPIDEMIA, UNSPECIFIED HYPERLIPIDEMIA TYPE: ICD-10-CM

## 2024-04-24 DIAGNOSIS — E11.9 TYPE 2 DIABETES MELLITUS WITHOUT COMPLICATION, WITH LONG-TERM CURRENT USE OF INSULIN (HCC): Primary | ICD-10-CM

## 2024-04-24 DIAGNOSIS — Z79.4 TYPE 2 DIABETES MELLITUS WITHOUT COMPLICATION, WITH LONG-TERM CURRENT USE OF INSULIN (HCC): Primary | ICD-10-CM

## 2024-04-24 LAB
CARTRIDGE LOT#: 1022 NUMERIC
HEMOGLOBIN A1C: 5.7 % (ref 4.3–5.6)

## 2024-04-24 PROCEDURE — 99214 OFFICE O/P EST MOD 30 MIN: CPT | Performed by: NURSE PRACTITIONER

## 2024-04-24 PROCEDURE — 83036 HEMOGLOBIN GLYCOSYLATED A1C: CPT | Performed by: NURSE PRACTITIONER

## 2024-04-24 PROCEDURE — 95251 CONT GLUC MNTR ANALYSIS I&R: CPT | Performed by: NURSE PRACTITIONER

## 2024-04-24 RX ORDER — METFORMIN HYDROCHLORIDE 750 MG/1
750 TABLET, EXTENDED RELEASE ORAL DAILY
Qty: 30 TABLET | Refills: 5 | Status: SHIPPED | OUTPATIENT
Start: 2024-04-24

## 2024-04-24 RX ORDER — DULAGLUTIDE 3 MG/.5ML
3 INJECTION, SOLUTION SUBCUTANEOUS WEEKLY
Qty: 2 ML | Refills: 5 | Status: SHIPPED | OUTPATIENT
Start: 2024-04-24

## 2024-04-24 NOTE — PROGRESS NOTES
Chief Complaint   Patient presents with    Diabetes     F/U - dexcom      HPI:   Marilynn Jasso is a 34 year old female who presents for a follow up visit for management of diabetes. Last A1c value was 5.7% done 2024.  Since last visit diabetes management has been stable and well controlled.  Pt tolerating jardiance well.     Patient is using continuous glucose monitoring or would be testing BGs at least 4 times per day.  Patient is on insulin injections.   Patient is making self-adjustments in insulin according to blood sugars.    Diabetes history:  Type: 2  Onset: 2023  Pt has been admitted to the hospital for blood sugars.     Pt does not have a hx of pancreatitis.   Initial consult date: 23    Current DM regimen:  Metformin  m tab daily with breakfast  Jardiance 10 mg daily in am  Trulicity 3 mg weekly       Hypoglycemia Tx:  Rule of 15    Previous DM therapies:  Lantus, Humalog - improved glucose control       Complications/Co-morbidities:   None       Modifying factors:  Medication adherence: yes   Recent illness/steroids: none since last visit  Barriers: None identified     Overall glucose control:   HGBA1C:    Lab Results   Component Value Date    A1C 5.7 (A) 2024    A1C 6.3 (A) 2024    A1C 6.0 (A) 10/20/2023     (H) 2023     Personal  Dexcom G6 CGM not streaming   Analysis of data: 24-24  Active wear time: 93% (Goal 70%)  Sensor download: full report  in media  Average glucose : 137 mg/dl   GMI: 6.6%     CV (coefficient of variation) : 17.2%      5% (last visit 15%) time above 180mg /dl (Goal < 25%)  0% (last visit <1%) time above 250 mg/dl (Goal < 5%)  95% (last visit 84%) time in target range:  mg/dl (Goal > 70%)  0% (last visit <1%) time below target range: 70mg/dl (Goal < 4%)     Evaluation   1. Baseline glucose in target range  2. occasional postprandial elevation with return to baseline  3. low frequency of hypoglycemia         Past  History:   She  has no past medical history on file.   Her family history is not on file.   She  reports that she has never smoked. She has never used smokeless tobacco. She reports that she does not drink alcohol and does not use drugs.     She is allergic to ketorolac.     Current Outpatient Medications on File Prior to Visit   Medication Sig    empagliflozin (JARDIANCE) 10 MG Oral Tab Take 1 tablet (10 mg total) by mouth daily.    Continuous Blood Gluc Sensor (DEXCOM G7 SENSOR) Does not apply Misc 1 each Every 10 days.    Blood Glucose Monitoring Suppl (CONTOUR NEXT EZ) w/Device Does not apply Kit 1 kit 3 (three) times daily before meals. Test blood glucose 3-4 times before meals and for signs/symptoms of hypoglycemia    Glucose Blood (CONTOUR NEXT TEST) In Vitro Strip Test blood glucose 3-4 times before meals and for signs/symptoms of hypoglycemia    Microlet Lancets (LEXUS MICROLET LANCETS) Does not apply Misc Test blood glucose 3-4 times before meals and for signs/symptoms of hypoglycemia     No current facility-administered medications on file prior to visit.       BP Readings from Last 3 Encounters:   04/24/24 114/78   01/05/24 118/58   10/20/23 108/64     Wt Readings from Last 3 Encounters:   04/24/24 224 lb 6.4 oz (101.8 kg)   01/05/24 230 lb (104.3 kg)   10/20/23 232 lb 6.4 oz (105.4 kg)     CMP  (most recent labs)   Lab Results   Component Value Date/Time     (H) 02/14/2024 08:42 AM    BUN 13 02/14/2024 08:42 AM    CREATSERUM 0.66 02/14/2024 08:42 AM    GFRNAA 84 11/29/2018 09:44 AM    GFRAA 97 11/29/2018 09:44 AM    EGFRCR 118 02/14/2024 08:42 AM    CA 8.9 02/14/2024 08:42 AM    ALKPHO 90 02/14/2024 08:42 AM    AST 3 (L) 02/14/2024 08:42 AM    ALT 26 02/14/2024 08:42 AM    BILT 0.4 02/14/2024 08:42 AM    TP 7.8 02/14/2024 08:42 AM    ALB 3.6 02/14/2024 08:42 AM     02/14/2024 08:42 AM    K 4.1 02/14/2024 08:42 AM     02/14/2024 08:42 AM    CO2 31.0 02/14/2024 08:42 AM             Lipids  (most recent labs)   Lab Results   Component Value Date/Time    CHOLEST 201 (H) 2024 08:42 AM    TRIG 167 (H) 2024 08:42 AM    HDL 39 (L) 2024 08:42 AM     (H) 2024 08:42 AM    NONHDLC 162 (H) 2024 08:42 AM         Thyroid  (most recent labs)   No results found for: \"TSH\", \"T4F\"     Micro Albumen/Creatinine:    Lab Results   Component Value Date    MICROALBCREA 11.3 2024    MICROALBCREA 9.1 10/20/2023          Lab results reviewed with patient.    REVIEW OF SYSTEMS:   Review of Systems  GENERAL HEALTH: feels well otherwise  SKIN: denies any unusual skin lesions or rashes  EYES: denies vision changes  RESPIRATORY: denies shortness of breath with exertion  CARDIOVASCULAR: denies chest pain on exertion  GI: denies abdominal pain, N/V/D  : Denies vaginal itching or discharge or dysuria  NEURO: denies headaches and reports numbness and tingling to finger tips    EXAM:   /78   Pulse 87   Resp 20   Wt 224 lb 6.4 oz (101.8 kg)   LMP 2023 (Approximate)   SpO2 99%   BMI 41.04 kg/m²  Estimated body mass index is 41.04 kg/m² as calculated from the following:    Height as of 23: 5' 2\" (1.575 m).    Weight as of this encounter: 224 lb 6.4 oz (101.8 kg).   Physical Exam  Vitals reviewed  Constitutional: Normal appearance, no acute distress  Pulmonary: Pulmonary effort is normal  Neurologic: Alert and oriented  Psychiatric: Normal mood and affect      ASSESSMENT AND PLAN:   As for her Diabetes, it is well controlled. CPM.     Medications:  Continue:   Metformin  m tab daily with breakfast  Jardiance 10 mg daily in am  Trulicity 3 mg weekly       Recommendations:   Continue dexcom CGM per pt choice - once insurance isn't covering resume BGM 1-2x daily  lose weight by increased dietary compliance and exercise   Reminded to get annual retinal exam  check feet daily  Repeat lipid before next visit      Cardiovascular:  The ASCVD Risk score  (Hung NEVAREZ, et al., 2019) failed to calculate for the following reasons:    The 2019 ASCVD risk score is only valid for ages 40 to 79     As for her hypertension, Blood Pressure is well controlled. Pt is not on ace/arb.   PLAN: reviewed diet, exercise and weight control, and labs as ordered     As for her cholesterol, Lipids are poorly controlled. Pt is not on statin.   PLAN: reviewed diet, exercise and weight control, and labs as ordered again in 6 months, if no improvement agreeable to add statin.     Patient is not on aspirin therapy.     DM Health Maintenance  Nephropathy screening: No nephropathy, continue to monitor  Last dilated eye exam: No data recorded Exam shows retinopathy? No data recorded  Last diabetic foot exam: Last Foot Exam: 24    Date of last PHQ-2 depression screen: PHQ-2 - Date of last depression screenin2023  Patient  reports that she has never smoked. She has never used smokeless tobacco.  When is flu vaccine due? No recommendations at this time  When is pneumonia vaccine due? Pneumococcal Vaccination(1 of 2 - PCV) Never done  Dentist : recommend every 6m     The patient indicates understanding of these issues and agrees to the plan.  Refills sent at time of office visit.    Diagnoses and all orders for this visit:    Type 2 diabetes mellitus without complication, with long-term current use of insulin (HCC)  -     HEMOGLOBIN A1C  -     Dulaglutide (TRULICITY) 3 MG/0.5ML Subcutaneous Solution Pen-injector; Inject 3 mg into the skin once a week.  -     metFORMIN  MG Oral Tablet 24 Hr; Take 1 tablet (750 mg total) by mouth daily.  -     Lipid Panel [E]; Future    Hyperlipidemia, unspecified hyperlipidemia type  -     Lipid Panel [E]; Future           Do the chronicity and potential for complications of disease pt will need ongoing monitoring.   Return in about 6 months (around 10/24/2024).    Spent 30 min obtaining patient history, evaluating patient, reviewing blood glucose  trends, discussing treatment options, lifestyle modifications and completing documentation -this time does not including sensor interpretation time if applicable.   The risks and benefits of my recommendations, as well as other treatment options were discussed with the patient today. Questions were also answered to the best of my knowledge.    Cecilia RICHARDS

## 2024-04-24 NOTE — PATIENT INSTRUCTIONS
Summary from visit:   Last A1c value was 5.7% done 2024.      Diabetes Medications:   Continue:   Metformin  m tab daily with breakfast  Jardiance 10 mg daily in am  Trulicity 3 mg weekly       It is important to take all of your medications as prescribed. Please call me if you cannot get the prescriptions filled or are having issues with refills.   Also, please call me if you have any issues with medication questions, side effects, dosing questions or problems with your blood sugar trends BEFORE CHANGING OR STOPPING ANY MEDICATIONS.    Remember to bring your glucose meter or blood sugar logbook to every appointment here at the diabetes center.   In order for me to determine any patterns in your blood sugars, you will need to test your blood sugar 1 times daily.   Please call with any concerns and Complete ordered labs and Schedule your annual diabetic eye exam prior to your next visit.   Return in about 6 months (around 10/24/2024).  ---------------------------------------------------------------------------------------------------------------------------------------------------    Reminders:  The A1C:  The A1C test provides us with your average blood sugar for the past 3 months. Keeping an A1C less than 7% for most people helps reduce or delay health problems that are related to diabetes. We sometimes make exceptions based on age, health history and other factors.     Blood sugar targets:  Before breakfast:   (preferably less than 110)  2 hours After meals: less than 180 (preferably less than 150)   Call for persistent blood sugars less than  75 or more than  200.   Blood sugars greater than 200 are not acceptable to reach your goal of improving diabetes      Hypoglycemia:    Watch for low blood sugars: (less than 70)  Symptoms of low blood sugar:   Shakiness or dizziness  Cold, clammy skin or sweating  Feeling hungry  Headache  Nervousness  A hard, fast heartbeat  Weakness  Confusion or  irritability  Blurred eyesight  Having nightmares or waking up confused or sweating  Numbness or tingling in the lips or tongue    Treatment of Low Blood sugar Action Plan  1. Check blood glucose to be sure that it is low. You can’t always go by symptoms. If in doubt, treat your low blood glucose anyway.  2. Take 15 grams of carbohydrate (carb). Here are some choices:  4 oz. regular fruit juice  3-4 glucose tablets  6 oz. regular soda   7-8 jelly beans  3. Recheck blood glucose after 10-15 minutes. If blood glucose is still low (less than 70 mg/dl) repeat the treatment (step 2).  4. If your next meal is more than one hour away, eat a small snack.  5. If you’re not sure what caused your low blood glucose, call your healthcare provider.  6. Always check your blood glucose before you drive           Diabetes Center Refill policy:     Allow 2-3 business days for refills  Contact your pharmacy at least 5 days prior to running out of medication and have them send an electronic request or submit request through the “request refill” option in your [employer] account.  Refills are not addressed after hours or on weekends; covering providers  do not authorize routine medications on weekends.  If your prescription is due for a refill, you may be due for a follow up appointment. This may impact the ability for you to get a 90 supply if requested.   To best provide you care, patients receiving routine medications need to be seen at least twice per year however if the A1C is above 8% you will be need to be seen more frequently.   Yearly blood work may also required for many medications to insure safe prescribing. If you are due for labs, you will have 30 days to complete the  requested labs before future refills are authorized.   In the event that your preferred pharmacy does not have the requested medication in stock (e.g. Backordered), it is your responsibility to find another pharmacy that has the requested medication available.   We will gladly send a new prescription to that pharmacy at your request.       More and more people are living long and healthy lives with diabetes. We are here to help you manage your diabetes. Let’s work together to make a plan that you can balance in your daily life. Please continue with your primary care physician/provider for your routine health care maintenance.   Thank you,   Cecilia Knight [facility] [place]

## 2024-05-20 DIAGNOSIS — Z79.4 TYPE 2 DIABETES MELLITUS WITHOUT COMPLICATION, WITH LONG-TERM CURRENT USE OF INSULIN (HCC): ICD-10-CM

## 2024-05-20 DIAGNOSIS — E11.9 TYPE 2 DIABETES MELLITUS WITHOUT COMPLICATION, WITH LONG-TERM CURRENT USE OF INSULIN (HCC): ICD-10-CM

## 2024-05-21 RX ORDER — DULAGLUTIDE 3 MG/.5ML
3 INJECTION, SOLUTION SUBCUTANEOUS WEEKLY
Qty: 6 ML | Refills: 0 | Status: SHIPPED | OUTPATIENT
Start: 2024-05-21

## 2024-05-21 NOTE — TELEPHONE ENCOUNTER
Requested Prescriptions     Pending Prescriptions Disp Refills    TRULICITY 3 MG/0.5ML Subcutaneous Solution Pen-injector [Pharmacy Med Name: TRULICITY 3MG/0.5ML SDP 0.5ML] 6 mL 0     Sig: INJECT 3 MG INTO THE SKIN ONCE A WEEK     Your appointments       Date & Time Appointment Department ([place])    Oct 18, 2024 10:00 AM CDT Diabetes Pump follow up with Cecilia Knight APRN [facility], [address] [place] [address] ([facility] [place])              [address] [facility] [place]  [address] [place] [id number] [phone]          Last A1c value was 5.7% done 4/24/2024.    Refill 4/24/24  LOV 4/24/24

## 2024-06-21 DIAGNOSIS — E11.9 TYPE 2 DIABETES MELLITUS WITHOUT COMPLICATION, WITH LONG-TERM CURRENT USE OF INSULIN (HCC): ICD-10-CM

## 2024-06-21 DIAGNOSIS — Z79.4 TYPE 2 DIABETES MELLITUS WITHOUT COMPLICATION, WITH LONG-TERM CURRENT USE OF INSULIN (HCC): ICD-10-CM

## 2024-06-22 DIAGNOSIS — E11.9 TYPE 2 DIABETES MELLITUS WITHOUT COMPLICATION, WITH LONG-TERM CURRENT USE OF INSULIN (HCC): ICD-10-CM

## 2024-06-22 DIAGNOSIS — Z79.4 TYPE 2 DIABETES MELLITUS WITHOUT COMPLICATION, WITH LONG-TERM CURRENT USE OF INSULIN (HCC): ICD-10-CM

## 2024-06-24 RX ORDER — ACYCLOVIR 400 MG/1
TABLET ORAL
Qty: 3 EACH | Refills: 5 | Status: SHIPPED | OUTPATIENT
Start: 2024-06-24

## 2024-06-24 RX ORDER — EMPAGLIFLOZIN 10 MG/1
10 TABLET, FILM COATED ORAL DAILY
Qty: 90 TABLET | Refills: 0 | Status: SHIPPED | OUTPATIENT
Start: 2024-06-24

## 2024-06-24 NOTE — TELEPHONE ENCOUNTER
Requested Prescriptions     Pending Prescriptions Disp Refills    DEXCOM G7 SENSOR Does not apply Misc [Pharmacy Med Name: DEXCOM G7 SENSOR] 3 each 5     Sig: USE AS DIRECTED AND CHANGE EVERY 10 DAYS     Future Appointments   Date Time Provider Department Center   10/18/2024 10:00 AM Cecilia Knight APRN [facility]     Last A1c value was 5.7% done 4/24/2024.  Refill 1/5/24  LOV 4/24/24

## 2024-06-24 NOTE — TELEPHONE ENCOUNTER
Requested Prescriptions     Pending Prescriptions Disp Refills    JARDIANCE 10 MG Oral Tab [Pharmacy Med Name: JARDIANCE 10MG TABLETS] 90 tablet 0     Sig: TAKE 1 TABLET(10 MG) BY MOUTH DAILY     Future Appointments   Date Time Provider Department Center   10/18/2024 10:00 AM Cecilia Knight APRN [facility]     Last A1c value was 5.7% done 4/24/2024.  Refill 4/22/24  LOV 4/25/24

## 2024-07-25 ENCOUNTER — TELEPHONE (OUTPATIENT)
Dept: ENDOCRINOLOGY CLINIC | Facility: CLINIC | Age: 34
End: 2024-07-25

## 2024-07-25 NOTE — TELEPHONE ENCOUNTER
No longer on insulin - once insurance isn't covering resume BGM 1-2x daily or can purchase jaya OOP

## 2024-07-25 NOTE — TELEPHONE ENCOUNTER
Received fax from [employer] that dexcom g7 sensor is needing a PA  Key [id number]    Per last ov notes, pt is not on any insulin. Not sure how this is getting covered? Proceed with PA?

## 2024-10-07 DIAGNOSIS — E11.9 TYPE 2 DIABETES MELLITUS WITHOUT COMPLICATION, WITH LONG-TERM CURRENT USE OF INSULIN (HCC): ICD-10-CM

## 2024-10-07 DIAGNOSIS — Z79.4 TYPE 2 DIABETES MELLITUS WITHOUT COMPLICATION, WITH LONG-TERM CURRENT USE OF INSULIN (HCC): ICD-10-CM

## 2024-10-07 NOTE — TELEPHONE ENCOUNTER
Received refill for Jardiance   Future Appointments   Date Time Provider Department Center   10/18/2024  3:15 PM Anitha Hyman APRN [facility] [place]     Last A1c value was 5.7% done 4/24/2024.  Refill 6/24/24 Ernesto SINGLETON 4/24/24 Ernesto   Requested Prescriptions     Pending Prescriptions Disp Refills    empagliflozin (JARDIANCE) 10 MG Oral Tab 90 tablet 0     Sig: Take 1 tablet (10 mg total) by mouth daily.

## 2024-10-17 ENCOUNTER — OFFICE VISIT (OUTPATIENT)
Facility: CLINIC | Age: 34
End: 2024-10-17
Payer: MEDICAID

## 2024-10-17 ENCOUNTER — LAB ENCOUNTER (OUTPATIENT)
Dept: LAB | Age: 34
End: 2024-10-17
Attending: NURSE PRACTITIONER
Payer: MEDICAID

## 2024-10-17 VITALS
SYSTOLIC BLOOD PRESSURE: 126 MMHG | HEIGHT: 62 IN | BODY MASS INDEX: 42.14 KG/M2 | HEART RATE: 89 BPM | OXYGEN SATURATION: 98 % | DIASTOLIC BLOOD PRESSURE: 74 MMHG | WEIGHT: 229 LBS

## 2024-10-17 DIAGNOSIS — E11.9 TYPE 2 DIABETES MELLITUS TREATED WITHOUT INSULIN (HCC): ICD-10-CM

## 2024-10-17 DIAGNOSIS — E13.40 NEUROPATHY DUE TO SECONDARY DIABETES (HCC): ICD-10-CM

## 2024-10-17 DIAGNOSIS — E11.9 TYPE 2 DIABETES MELLITUS TREATED WITHOUT INSULIN (HCC): Primary | ICD-10-CM

## 2024-10-17 DIAGNOSIS — E78.5 HYPERLIPIDEMIA, UNSPECIFIED HYPERLIPIDEMIA TYPE: ICD-10-CM

## 2024-10-17 LAB
ANION GAP SERPL CALC-SCNC: 8 MMOL/L (ref 0–18)
BUN BLD-MCNC: 9 MG/DL (ref 9–23)
CALCIUM BLD-MCNC: 9.4 MG/DL (ref 8.7–10.4)
CHLORIDE SERPL-SCNC: 105 MMOL/L (ref 98–112)
CO2 SERPL-SCNC: 26 MMOL/L (ref 21–32)
CREAT BLD-MCNC: 0.62 MG/DL
EGFRCR SERPLBLD CKD-EPI 2021: 119 ML/MIN/1.73M2 (ref 60–?)
FASTING STATUS PATIENT QL REPORTED: NO
GLUCOSE BLD-MCNC: 152 MG/DL (ref 70–99)
HEMOGLOBIN A1C: 6.5 % (ref 4.3–5.6)
OSMOLALITY SERPL CALC.SUM OF ELEC: 290 MOSM/KG (ref 275–295)
POTASSIUM SERPL-SCNC: 3.8 MMOL/L (ref 3.5–5.1)
SODIUM SERPL-SCNC: 139 MMOL/L (ref 136–145)
T4 FREE SERPL-MCNC: 1.4 NG/DL (ref 0.8–1.7)
TSI SER-ACNC: 2.23 MIU/ML (ref 0.55–4.78)

## 2024-10-17 PROCEDURE — 80048 BASIC METABOLIC PNL TOTAL CA: CPT

## 2024-10-17 PROCEDURE — 86341 ISLET CELL ANTIBODY: CPT

## 2024-10-17 PROCEDURE — 83036 HEMOGLOBIN GLYCOSYLATED A1C: CPT | Performed by: NURSE PRACTITIONER

## 2024-10-17 PROCEDURE — 84681 ASSAY OF C-PEPTIDE: CPT

## 2024-10-17 PROCEDURE — 36415 COLL VENOUS BLD VENIPUNCTURE: CPT

## 2024-10-17 PROCEDURE — 99215 OFFICE O/P EST HI 40 MIN: CPT | Performed by: NURSE PRACTITIONER

## 2024-10-17 PROCEDURE — 86341 ISLET CELL ANTIBODY: CPT | Performed by: NURSE PRACTITIONER

## 2024-10-17 PROCEDURE — 84443 ASSAY THYROID STIM HORMONE: CPT

## 2024-10-17 PROCEDURE — 84439 ASSAY OF FREE THYROXINE: CPT

## 2024-10-17 NOTE — PROGRESS NOTES
[facility]    Name: Marilynn Jasso    Date: 10/17/24    Marilynn Jasso is a 34 year old female who presents for evaluation of T2DM management.     Chief complaint: New Patient (NP here to establish care for DM2, per pt no current concerns or sx./Pt checks BG via fingerstick, fasting today 98 /Last A1c value was 5.7% done 2024. /Last Diabetic Eye Exam-Never done/Last Diabetic Foot Exam-24)       Subjective:   DM hx:  -Diagnosed with diabetes in 2023  -Family history- yes, father and MGM    Initial HPI consult - 10/17/2024  Last office visit for DM mgmt of the pt:  24  Mgmt by: Cecilia Knight NP from [facility]  LMP 2024, not breastfeeding, had tubal ligation   DM meds at first office visit:  Metformin  m tab daily with breakfast  Jardiance 10 mg daily in am  Trulicity 3 mg every Monday     Blood Glucose log reviewed. Checking every other day. Morning/fasting: Patient did not bring her glucometer today, pt. reported range   episodes of hypoglycemia: No      -Re: potential DM medication contraindication (if positive, checkbox selected):  [] History of pancreatitis- denies   [] Personal/fam hx of medullary thyroid cancer/MEN2- denies  [] History of recent/frequent UTI/yeast infxn - denies   [] Previous amputation related to diabetes- denies    -Presence of associated DM complications (if positive, checkbox selected):  [] Macrovascular complications (CAD/CVA/PAD)- denies  [x] Neuropathy + sometimes feel in her toes.   [] Retinopathy  [] Nephropathy- denies   [] HTN- denies   [x] Hyperlipidemia  [] Stroke/TIA- denies   [] Gastroparesis - denies   [] Wound, ulcer or amputations- denies     - Lifestyle:   - Modifying factors:   [] Steroid use: denies    Previously trialed/failed DM meds: Lantus, Humalog - improved glucose control     History/Other:    Allergies, PMH, SocHx and FHx reviewed and updated as appropriate in Epic on    empagliflozin (JARDIANCE) 10 MG  Oral Tab Take 1 tablet (10 mg total) by mouth daily. 90 tablet 0    TRULICITY 3 MG/0.5ML Subcutaneous Solution Pen-injector INJECT 3 MG INTO THE SKIN ONCE A WEEK 6 mL 0    metFORMIN  MG Oral Tablet 24 Hr Take 1 tablet (750 mg total) by mouth daily. 30 tablet 5    Blood Glucose Monitoring Suppl (CONTOUR NEXT EZ) w/Device Does not apply Kit 1 kit 3 (three) times daily before meals. Test blood glucose 3-4 times before meals and for signs/symptoms of hypoglycemia 1 kit 0    Glucose Blood (CONTOUR NEXT TEST) In Vitro Strip Test blood glucose 3-4 times before meals and for signs/symptoms of hypoglycemia 100 strip 2     Allergies[1]  Current Outpatient Medications   Medication Sig Dispense Refill    empagliflozin (JARDIANCE) 10 MG Oral Tab Take 1 tablet (10 mg total) by mouth daily. 90 tablet 0    TRULICITY 3 MG/0.5ML Subcutaneous Solution Pen-injector INJECT 3 MG INTO THE SKIN ONCE A WEEK 6 mL 0    metFORMIN  MG Oral Tablet 24 Hr Take 1 tablet (750 mg total) by mouth daily. 30 tablet 5    Blood Glucose Monitoring Suppl (CONTOUR NEXT EZ) w/Device Does not apply Kit 1 kit 3 (three) times daily before meals. Test blood glucose 3-4 times before meals and for signs/symptoms of hypoglycemia 1 kit 0    Glucose Blood (CONTOUR NEXT TEST) In Vitro Strip Test blood glucose 3-4 times before meals and for signs/symptoms of hypoglycemia 100 strip 2    Microlet Lancets (LEXUS MICROLET LANCETS) Does not apply Misc Test blood glucose 3-4 times before meals and for signs/symptoms of hypoglycemia 100 each 2     History reviewed. No pertinent past medical history.  Family History   Problem Relation Age of Onset    Diabetes Father         type 2    Asthma Brother     Diabetes Maternal Grandmother         type 2    Asthma Son     Asthma Son     Asthma Daughter      Social history: Reviewed.      ROS/Exam    REVIEW OF SYSTEMS: Ten point review of systems has been performed and is otherwise negative and/or non-contributory, except as  described above.     VITALS  Vitals:    10/17/24 1334   BP: 126/74   Pulse: 89   SpO2: 98%   Weight: 229 lb (103.9 kg)   Height: 5' 2\" (1.575 m)       Wt Readings from Last 6 Encounters:   10/17/24 229 lb (103.9 kg)   04/24/24 224 lb 6.4 oz (101.8 kg)   01/05/24 230 lb (104.3 kg)   10/20/23 232 lb 6.4 oz (105.4 kg)   08/18/23 239 lb (108.4 kg)   07/31/23 241 lb (109.3 kg)       PHYSICAL EXAM  CONSTITUTIONAL:  awake, alert, cooperative, no apparent distress, and appears stated age Vss stable.   PSYCH: normal affect  LUNGS: breathing comfortably  CARDIOVASCULAR:  regular rate   NECK:  no palpable thyroid nodules     Labs/Imaging:     Lab Results   Component Value Date    CHOLEST 201 (H) 02/14/2024    TRIG 167 (H) 02/14/2024    HDL 39 (L) 02/14/2024     (H) 02/14/2024     Lab Results   Component Value Date    MICROALBCREA 11.3 01/05/2024    MICROALBCREA 9.1 10/20/2023      Lab Results   Component Value Date    CREATSERUM 0.62 10/17/2024    CREATSERUM 0.66 02/14/2024    EGFRCR 119 10/17/2024    EGFRCR 118 02/14/2024     Lab Results   Component Value Date    AST 3 (L) 02/14/2024    AST 33 06/12/2023    ALT 26 02/14/2024    ALT 74 (H) 06/12/2023       Lab Results   Component Value Date    TSH 2.233 10/17/2024    T4F 1.4 10/17/2024       Overall glucose control:  Lab Results   Component Value Date    A1C 6.5 (A) 10/17/2024    A1C 5.7 (A) 04/24/2024    A1C 6.3 (A) 01/05/2024    A1C 6.0 (A) 10/20/2023    A1C 10.2 (H) 06/12/2023       Supplementary Documentation:   -Surveillance for Diabetes Complications & Risks  Foot exam/neuropathy: Last Foot Exam: 01/05/24    Retinopathy screening: No data recordedNo data recorded    Assessment & Plan:     ICD-10-CM    1. Type 2 diabetes mellitus treated without insulin (HCC)  E11.9 TSH and Free T4     IA-2 Autoantibodies     VICTORIANO-65 Autoantibody     Zinc Transporter 8 (ZnT8) Antibodies     Islet Cell Cytoplasmic Antibody, IgG     Basic Metabolic Panel (8)     C-Peptide      2.  Hyperlipidemia, unspecified hyperlipidemia type  E78.5       3. BMI 40.0-44.9, adult ([facility])  Z68.41           Marilynn Jasso is a pleasant 34 year old female here for evaluation of:    #Diabetes w/ neuropathy  PMHx of Type 2 diabetes mellitus diagnosed in .  Last A1c value was 6.5% done 10/17/2024. At goal in the setting of anemia. Referred her to see PCP in regards to her hx of anemia. Pt has hx of tubal ligation.   Goal <7%. Importance of better glucose control in preventing onset/progression of end-organ damage discussed, as well as goals of therapy and clinical significance of A1C.  Add on SAI antibodies r/o and tsh and ft4     Plan:  - med changes:  - Continue  Metformin  m tab daily with breakfast, Jardiance 10 mg daily in am, Trulicity 3 mg every Monday   Continue checking glucose 1-2x a day: fasting or at bedtime. - Targeted fasting glucose (at least 8-10 hrs of no food/sweetened beverage intake)< 130 and 2 hrs after lunch or dinner< 180  and prior lunch or dinner <140  Schedule eye exam prior to your follow up visit  Recommended pt to see her PCP to discuss hx of anemia  - continue to check BG 1-2x/day using glucometer or CGM   - Discussed management of low glucose and provided instructions in AVS   -See above header \"Supplementary Documentation\" for surveillance for diabetes complications & risks  - No open wound at this time. Discussed proper foot care.   - Recommended to pt .to see ophthalmology, given her list of ophthalmology      Nephropathy screening:   Lab Results   Component Value Date    EGFRCR 119 10/17/2024    MICROALBCREA 11.3 2024      Blood pressure control: SBP is to goal <130   BP Readings from Last 1 Encounters:   10/17/24 126/74   BP Meds:      #Hyperlipidemia  Lipids: LDL is not to goal , will discuss further on next visit  Lab Results   Component Value Date     (H) 2024    TRIG 167 (H) 2024   Cholesterol Meds:         #BMI 41  - discussed Balanced  diet: carbohydrates, protein, healthy fats and fiber in each meal. Exercise of at least 150 mins each week. Decrease your simple carbohydrates intake such as sweets: cookies, soda, candy, white rice, white pasta, syrups  - will consider referring her to bariatric on next visit    Return in about 3 months (around 1/17/2025).    Total time spent  45 minutes today on obtaining history, reviewing pertinent labs, evaluating patient, providing multiple treatment options, reinforcing diet/exercise and compliance, and completing documentation, of which greater than 50% was spent in face to face discussion with the patient  who demonstrated understanding and agreement with plan.     Thank you for allowing me to participate in the care of this patient.  Please feel free to contact me with any questions.    MAURICE Torre, [facility]-BC  Endocrinology, Diabetes & Metabolism   10/17/24    In reviewing this note, please be advised that Dragon Voice Recognition software used to dictate the note may have made errors in recognizing some of the words or phrases.     Note to patient: The 21 Century Cures Act makes medical notes like these available to patients in the interest of transparency. However, be advised this is a medical document. It is intended as peer to peer communication. It is written in medical language and may contain abbreviations or verbiage that are unfamiliar. It may appear blunt or direct. Medical documents are intended to carry relevant information, facts as evident, and the clinical opinion of the practitioner.           [1]   Allergies  Allergen Reactions    Ketorolac OTHER (SEE COMMENTS)

## 2024-10-17 NOTE — PATIENT INSTRUCTIONS
Return Visit:  [x]  APN in 3 months  [x]  In person only    [x]  Directions to 1st floor lab    [x]  Give blood sugar log        Summary of today's visit:  Medication changes:    Continue  Metformin  m tab daily with breakfast, Jardiance 10 mg daily in am, Trulicity 3 mg every Monday   Continue checking glucose 1-2x a day: fasting or at bedtime. - Targeted fasting glucose (at least 8-10 hrs of no food/sweetened beverage intake)< 130 and 2 hrs after lunch or dinner< 180  and prior lunch or dinner <140    Schedule eye exam prior to your follow up visit  Ophthalmologist for diabetes eye exam:  [facility]: Dr Ilda Lopez- [address] [facility] [address], [place] [address]- PHONE: [phone]  [facility]: [phone]  Dr. Oppenheim in [place]- [phone]  [facility]: Phone: [phone], Address: [address] [place] [address] [address], [place] [address]  If you have Medicaid: [facility] [place]- [phone], [facility]- [phone]- [place], [employer], or [employer]  [place]: [facility] - [phone]  [place]: Nilo Henry- [email or web address]- they can do screenings using their machine  [facility]- [phone] - in [place]  Ania Gutierrez MD - [place]  Please complete your labs  prior to your follow up visit   Please let us know if you require any refills at least 1 week prior to your medication running out   Please call our office if sugars at home are consistently greater than 250 or less than 70   See a Primary care to discuss hx of anemia  Primary care office locations:    Search for: [facility]  From there, pick between family medicine (can see children and adults) vs. internal medicine (internist, only sees adult patients)  I recommend reading through their biographies so you can get a good feel of who would be a good fit for you.    Here is the website to  search:  [email or web address]    To find a physician near you and schedule an appointment in minutes visit   [email or web address], download the [employer] haroon, or call [phone].      HOW TO TREAT LOW BLOOD SUGAR (Hypoglycemia)  Low blood sugar= Less than 70, or if you start to have symptoms (below)  Symptoms: Shaking or trembling, fast heart rate, extreme hunger, sweating, confusion/difficulty concentrating, dizziness.    How to treat a low blood sugar if you are able to eat/drink: The Rule of 15/15  If you are using continuous glucose monitor that says you are low, but you do not have any symptoms, verify on fingerstick that your blood sugar is actually low before treating.   Eat 15 grams of carbs (see examples below)  Check your blood sugar after 15 minutes. If it’s still below your target range, have another serving.   Repeat these steps until it’s in your target range. Once it’s in range, if you're nervous about your sugar going low again, have a protein source (ie, a spoonful of peanut butter).   If you have a CGM you want to look for how your arrow has changed. If you arrow is pointed up or sideways after 15 min, give your CGM more time OR check with a finger stick. Try not to eat more food until at least 15 min after the first BG check - otherwise you risk having a rebound high.  If you are experiencing symptoms and you are unable to check your blood glucose for any reason, treat the hypoglycemia.  If someone has a low blood sugar and is unconscious: Don’t hesitate to call 911. If someone is unconscious and glucagon is not available or someone does not know how to use it, call 911 immediately.     To treat a low, I recommend you carry with you easy, pre-portioned treatment for low blood sugars that are 15G of carbs:   - Children sized squeeze pouch applesauce (high fiber + carbs help prevent too high of a spike)  - Small children's sized juicebox- 15g carb --> 4oz juice  box  - Glucose tablets from [employer], you can find them near diabetes supplies --> Note, you will need to eat 3-4 tablets to get to 15g of carbs  - Children sized fruit snack pack- look for one with 15 grams of total carbohydrate  - Choice of how to treat your low is important. Complex carbs, or foods that contain fats along with carbs (like chocolate) can slow the absorption of glucose and should NOT be used to treat an emergency low        Diabetic Foot Care  Diabetes can lead to a number of foot problems. People with diabetes have a12% to 25% chance of getting a foot sore during their lifetime. But you can prevent these with a little extra foot care. If your diabetes isn't well controlled, it can harm blood vessels. This results in poor blood flow to your feet. When your skin doesn't get enough blood flow, you are more likely to get pressure injuries. These heal slowly.   Diabetes can also damage nerves. This interferes with your ability to feel pain and pressure. When you can’t feel your foot normally, it is easy to injure your skin, bones, and joints without knowing it. For these reasons, diabetes increases the risk for fungal infections, bunions, and pressure injuries. A pressure injury is a sore or break in the skin. With these injuries, the skin seems to have worn away. Deep injuries can lead to bone infection.   Gangrene is the most serious foot problem of diabetes. It usually occurs on the tips of the toes as blackened areas of skin. The black area is dead tissue. In severe cases, gangrene spreads to the entire toe, other toes, and the whole foot. You may need to have your foot, toe, or even leg amputated. Good foot care and blood sugar control can prevent this.   Home care  Wear comfortable, well-fitting shoes.  Wash your feet daily with warm water and mild soap.  After drying, put a moisturizing cream or lotion on the top and bottom of your feet. Don't put lotion between toes.  Check your feet daily  for skin breaks, blisters, swelling, or redness. Look between your toes as well. If you can't see the bottoms of your feet, ask someone to look or use a mirror.  Wear cotton socks and change them every day.  Trim toenails carefully, and don't cut your cuticles. Ask your healthcare provider to trim any corns or calluses.  Keep your blood sugar under control with medicines, diet, and activity.  If you smoke and have diabetes, it's very important that you stop. Smoking reduces blood flow to your foot.  Schedule foot exams at least every year, or more often if you have foot problems.  Put your feet up when sitting, wiggle toes, and move ankles to help improve blood flow.     Use a mirror to check your feet for problems.     Don't do activities that increase your risk of foot injury:   Don't walk barefoot.  Don't use heating pads or hot water bottles on your feet.  Don't put your foot in a hot tub without first checking the temperature with your hand.  Follow-up care  Follow up with your healthcare provider as advised. Take off your shoes and socks before your appointment starts. This will remind your healthcare provider to check your feet. Report any cut, puncture, scrape, blister, or other injury to your foot. Also report if you have a bunion, hammertoes, ingrown toenail, or pressure injury on your foot.   When to get medical advice   Call your healthcare provider right away if any of these occur:  Black skin color anywhere on the foot  Open sore with pus draining from the wound  Increasing foot or leg pain  New areas of redness or swelling or tender areas of the foot  Fever of 100.4°F (38°C) or higher, or as advised by your provider  Geovanna last reviewed this educational content on 8/1/2022  © 8051-6594 [employer]. All rights reserved. This information is not intended as a substitute for professional medical care. Always follow your healthcare professional's instructions.

## 2024-10-18 LAB — C-PEPTIDE: 10.7 NG/ML

## 2024-10-19 LAB — GAD-65: <5 U/ML

## 2024-10-21 LAB — PANCREATIC ISLET CELLS: NEGATIVE

## 2024-10-31 LAB — IA-2 AUTOABS: <7.5 U/ML

## 2024-11-04 LAB — ZNT8 ABS: <15 U/ML

## 2025-02-03 DIAGNOSIS — E11.9 TYPE 2 DIABETES MELLITUS TREATED WITHOUT INSULIN (HCC): Primary | ICD-10-CM

## 2025-02-03 NOTE — TELEPHONE ENCOUNTER
Patient left message requesting refill of diabetes medications.  Has established with endocrine APRN, will forward.

## 2025-02-04 RX ORDER — DULAGLUTIDE 3 MG/.5ML
3 INJECTION, SOLUTION SUBCUTANEOUS WEEKLY
Qty: 2 ML | Refills: 0 | Status: SHIPPED | OUTPATIENT
Start: 2025-02-04

## 2025-02-04 NOTE — TELEPHONE ENCOUNTER
Signed order, and reminded patient to make an appt ( through my chart) back for more refills, due for follow up,  closing encounter.

## 2025-02-04 NOTE — TELEPHONE ENCOUNTER
Patient read MY CHART MESSAGE.  Received receipt confirmation from  pharmacy from RX that was sent.   Closing encounter.

## 2025-02-04 NOTE — TELEPHONE ENCOUNTER
Endocrine Refill protocol for oral and injectable diabetic medications    Protocol Criteria:  PASSED  Reason: N/A    If all below requirements are met, send a 90-day supply with 1 refill per provider protocol.    Verify appointment with Endocrinology completed in the last 6 months or scheduled in the next 3 months.  Verify A1C has been completed within the last 6 months and is below 8.5%     Last completed office visit: 1/16/2025 Anitha Hyman APRN   Next scheduled Follow up: No future appointments.   Last A1c result: Last A1c value was 6.5% done 10/17/2024.

## 2025-04-02 DIAGNOSIS — E11.9 TYPE 2 DIABETES MELLITUS TREATED WITHOUT INSULIN (HCC): ICD-10-CM

## 2025-04-02 RX ORDER — DULAGLUTIDE 3 MG/.5ML
3 INJECTION, SOLUTION SUBCUTANEOUS WEEKLY
Qty: 6 ML | Refills: 0 | Status: SHIPPED | OUTPATIENT
Start: 2025-04-02

## 2025-04-16 NOTE — PATIENT INSTRUCTIONS
Return Visit:  APN: Return in about 6 weeks (around 5/28/2025) for diabetes follow up.  [] Video visit  [x] In person only      [x]  Directions to 1st floor lab    []  Give blood sugar log  []  PAP paperwork for Ozempic/Jardiance (english/Chinese)         Summary of today's visit:  Medication changes:    For the next 4 weeks:  Finish Trulicity 3 mg   Increase Metformin 750 mg to twice a day ( total 1500 mg/day)  Increase Jardiance from 10 to 25 mg per day. Okay to finish your remaining 10 mg tab ( to take 20 mg per day for now )  On Week 5  Start Trulicity 4.5 mg weekly  Decrease Metformin from 2 tab to 1 tab 750 mg per tab  Continue Jardiance 25 mg per day   - start taking over the counter fish oil or omega 3  -start checking glucose 1-2 x a day: fasting or 2 hrs after dinner  - Schedule eye exam ( to check for retinopathy related to diabetes) prior to your follow up visit  - Ophthalmologist for diabetes eye exam:  [facility] ( with various locations:[place], [place], [place], [place], [place]) :  -  [phone] or [phone]    [place]:   - [facility] ( ophthalmology): Phone: [phone], Address: [address] [place] [address]  - [facility], [facility] [place](accepts MEDICAID) ( ophthalmology)- [phone]  - [place] [facility] associate: Dr. Gage or Dr. Morris  ( ophthalmology)- [phone]  -[facility]: Dr Ilda Lopez- PHONE: [phone], [address], [place] [address]    [place]:  - Dr. Oppenheim( ophthalmology)- [phone]  - [facility] ( optometrist)- [phone]- [place] [facility], or [employer] [place]: [facility]( optometrist) - [phone]    [place]: Nilo Henry- [facility] ( optometrist)- [phone] they can do screenings using their machine    [place]: [facility] ( optometrist)- [phone]     [place]: Ania Gutierrez MD ( ophthalmology) -   [phone]   - If we started a new medication today that may not be covered by your insurance, our staff will reach out to you regarding any changes in the plan   - If on insulin, please ensure you have glucagon at home for emergencies   - Targeted glucose levels  - before breakfast or fasting glucose (at least 8-10 hrs of no food/sweetened beverage intake)    -  2 hrs after lunch or dinner< 180   -  before lunch or dinner <140  -  Follow 15g/15 min rule if you develop any hypoglycemia symptoms w/ glucose <70   -  but if glucose <55 follow 30g/15 min rule. Once glucose above 80, eat a protein or food w protein ie cheese, peanut butter, almond butter, cheese, yogurt.     Medication changes  Start Taking               Lancets Does not apply Misc Use to test BG 1x/day.    Glucose Blood (BLOOD GLUCOSE TEST) In Vitro Strip Use to test BG 1x/day    Blood Glucose Monitoring Suppl Does not apply Kit Use to test BG.    Dulaglutide (TRULICITY) 4.5 MG/0.5ML Subcutaneous Solution Auto-injector Inject 4.5 mg into the skin once a week.    empagliflozin (JARDIANCE) 25 MG Oral Tab Take 1 tablet (25 mg total) by mouth daily.          These Medications Have Changed       Start Taking Instead of    metFORMIN  MG Oral Tablet 24 Hr metFORMIN  MG Oral Tablet 24 Hr    Dosage:  Take 1 tablet (750 mg total) by mouth 2 (two) times daily with meals. - Oral Dosage:  Take 1 tablet (750 mg total) by mouth daily. - Oral          Stop Taking                Dulaglutide (TRULICITY) 3 MG/0.5ML Subcutaneous Solution Auto-injector    Inject 3 mg into the skin once a week.            Additional comments:   - If labs were ordered today, please complete prior to your follow up visit   - Please let us know if you require any refills at least 1 week prior to your medication running out   - Please call our office if sugars at home are consistently greater than 250 or less than 70     HOW TO TREAT LOW BLOOD SUGAR (Hypoglycemia)  Low  blood sugar= Less than 70, or if you start to have symptoms (below)  Symptoms: Shaking or trembling, fast heart rate, extreme hunger, sweating, confusion/difficulty concentrating, dizziness.    How to treat a low blood sugar if you are able to eat/drink: The Rule of 15/15  If you are using continuous glucose monitor that says you are low, but you do not have any symptoms, verify on fingerstick that your blood sugar is actually low before treating.   Eat 15 grams of carbs (see examples below)  Check your blood sugar after 15 minutes. If it’s still below your target range, have another serving.   Repeat these steps until it’s in your target range. Once it’s in range, if you're nervous about your sugar going low again, have a protein source (ie, a spoonful of peanut butter).   If you have a CGM you want to look for how your arrow has changed. If you arrow is pointed up or sideways after 15 min, give your CGM more time OR check with a finger stick. Try not to eat more food until at least 15 min after the first BG check - otherwise you risk having a rebound high.  If you are experiencing symptoms and you are unable to check your blood glucose for any reason, treat the hypoglycemia.  If someone has a low blood sugar and is unconscious: Don’t hesitate to call 911. If someone is unconscious and glucagon is not available or someone does not know how to use it, call 911 immediately.     To treat a low glucose <70, I recommend you carry with you easy, pre-portioned treatment for low blood sugars that are 15G of carbs:   - Children sized squeeze pouch applesauce (low fiber)  - Small children's sized juicebox- 15g carb --> 4oz juice box  - Glucose tablets from [facility], you can find them near diabetes supplies --> Note, you will need to eat 3-4 tablets to get to 15g of carbs  - Children sized fruit snack pack- look for one with 15 grams of total carbohydrate  - Choice of how to treat your low is important. Complex carbs, or  foods that contain fats along with carbs (like chocolate) can slow the absorption of glucose and should NOT be used to treat an emergency low

## 2025-04-16 NOTE — PROGRESS NOTES
[facility] Note    Name: Marilynn Jasso    Date: 25    Marilynn Jasso is a 35 year old female who presents for evaluation of T2DM management.     Chief complaint: Follow - Up (PT here for routine T2DM f/u, per pt no current concerns or sx./Per pt not checking bg, patient states she lost glucometer. /Last A1c value was 6.5% done 10/17/2024.)       Subjective:   DM hx:  -Diagnosed with diabetes in 2023  -Family history- yes, father and MGM    Initial HPI consult - 10/17/2024  Last office visit for DM mgmt of the pt:  24  Mgmt by: Cecilia Knight NP from [facility]  LMP 2024, not breastfeeding, had tubal ligation   DM meds at first office visit:  Metformin  m tab daily with breakfast  Jardiance 10 mg daily in am  Trulicity 3 mg every Monday     Blood Glucose log reviewed. Checking every other day. Morning/fasting: Patient did not bring her glucometer today, pt. reported range   episodes of hypoglycemia: No      -Re: potential DM medication contraindication (if positive, checkbox selected):  [] History of pancreatitis- denies   [] Personal/fam hx of medullary thyroid cancer/MEN2- denies  [] History of recent/frequent UTI/yeast infxn - denies   [] Previous amputation related to diabetes- denies    -Presence of associated DM complications (if positive, checkbox selected):  [] Macrovascular complications (CAD/CVA/PAD)- denies  [x] Neuropathy + sometimes feel in her toes.   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 She admitted that she did not take her Metformin and Jardiance in the last 2 days and also she missed  it also during the time they were moving last Nov for couple of weeks.   Current treatment: Metformin  m tab daily with breakfast, Jardiance 10 mg daily in am, Trulicity 3 mg every Monday   Testing: not testing   Hypoglycemia: denies   Complication:  no hospitalization and no ER visit since last office visit.    No plans for pregnancy, had tubal ligation ( past hx of 3 c sections)  History/Other:    Allergies, PMH, SocHx and FHx reviewed and updated as appropriate in Epic on    Lancets Does not apply Misc Use to test BG 1x/day. 100 each 2    Glucose Blood (BLOOD GLUCOSE TEST) In Vitro Strip Use to test BG 1x/day 100 strip 2    Blood Glucose Monitoring Suppl Does not apply Kit Use to test BG. 1 kit 0    Dulaglutide (TRULICITY) 4.5 MG/0.5ML Subcutaneous Solution Auto-injector Inject 4.5 mg into the skin once a week. 6 mL 0    metFORMIN  MG Oral Tablet 24 Hr Take 1 tablet (750 mg total) by mouth 2 (two) times daily with meals. 180 tablet 1    empagliflozin (JARDIANCE) 25 MG Oral Tab Take 1 tablet (25 mg total) by mouth daily. 90 tablet 0    TRULICITY 3 MG/0.5ML Subcutaneous Solution Pen-injector INJECT 3 MG INTO THE SKIN ONCE A WEEK 6 mL 0    Blood Glucose Monitoring Suppl (CONTOUR NEXT EZ) w/Device Does not apply Kit 1 kit 3 (three) times daily before meals. Test blood glucose 3-4 times before meals and for signs/symptoms of hypoglycemia 1 kit 0    Glucose Blood (CONTOUR NEXT TEST) In Vitro Strip Test blood glucose 3-4 times before meals and for signs/symptoms of hypoglycemia 100 strip 2    Microlet Lancets (LEXUS MICROLET LANCETS) Does not apply Misc Test blood glucose 3-4 times before meals and for signs/symptoms of hypoglycemia 100 each 2     Allergies[1]  Current Outpatient Medications   Medication Sig Dispense Refill    Lancets Does not apply Misc Use to test BG 1x/day. 100 each 2    Glucose Blood (BLOOD GLUCOSE TEST) In Vitro Strip Use to test BG 1x/day 100 strip 2    Blood Glucose Monitoring  Suppl Does not apply Kit Use to test BG. 1 kit 0    Dulaglutide (TRULICITY) 4.5 MG/0.5ML Subcutaneous Solution Auto-injector Inject 4.5 mg into the skin once a week. 6 mL 0    metFORMIN  MG Oral Tablet 24 Hr Take 1 tablet (750 mg total) by mouth 2 (two) times daily with meals. 180 tablet 1    empagliflozin (JARDIANCE) 25 MG Oral Tab Take 1 tablet (25 mg total) by mouth daily. 90 tablet 0    TRULICITY 3 MG/0.5ML Subcutaneous Solution Pen-injector INJECT 3 MG INTO THE SKIN ONCE A WEEK 6 mL 0    Blood Glucose Monitoring Suppl (CONTOUR NEXT EZ) w/Device Does not apply Kit 1 kit 3 (three) times daily before meals. Test blood glucose 3-4 times before meals and for signs/symptoms of hypoglycemia 1 kit 0    Glucose Blood (CONTOUR NEXT TEST) In Vitro Strip Test blood glucose 3-4 times before meals and for signs/symptoms of hypoglycemia 100 strip 2    Microlet Lancets (LEXUS MICROLET LANCETS) Does not apply Misc Test blood glucose 3-4 times before meals and for signs/symptoms of hypoglycemia 100 each 2     History reviewed. No pertinent past medical history.  Family History   Problem Relation Age of Onset    Diabetes Father         type 2    Asthma Brother     Diabetes Maternal Grandmother         type 2    Asthma Son     Asthma Son     Asthma Daughter      Social history: Reviewed.      ROS/Exam    REVIEW OF SYSTEMS: Ten point review of systems has been performed and is otherwise negative and/or non-contributory, except as described above.     VITALS  Vitals:    04/16/25 1000   BP: 126/72   Pulse: 83   SpO2: 97%   Height: 5' 2\" (1.575 m)       Wt Readings from Last 6 Encounters:   10/17/24 229 lb (103.9 kg)   04/24/24 224 lb 6.4 oz (101.8 kg)   01/05/24 230 lb (104.3 kg)   10/20/23 232 lb 6.4 oz (105.4 kg)   08/18/23 239 lb (108.4 kg)   07/31/23 241 lb (109.3 kg)       PHYSICAL EXAM  CONSTITUTIONAL:  awake, alert, cooperative, no apparent distress, and appears stated age Vss stable.   PSYCH: normal affect  LUNGS:  breathing comfortably  CARDIOVASCULAR:  regular rate   NECK:  no palpable thyroid nodules   Musculoskeletal:  Diabetes foot exam:   Good foot hygiene.   Bilateral barefoot skin diabetic exam: normal.  Visualized feet and the appearance : normal.  Bilateral monofilament/sensation of both feet is normal. Vibration to dorsum to the first toe perceived.   Bilateral 2+ pedal pulse pedal pulse exam       Labs/Imaging:     Lab Results   Component Value Date    CHOLEST 201 (H) 02/14/2024    TRIG 167 (H) 02/14/2024    HDL 39 (L) 02/14/2024     (H) 02/14/2024     Lab Results   Component Value Date    MICROALBCREA 11.3 01/05/2024    MICROALBCREA 9.1 10/20/2023      Lab Results   Component Value Date    CREATSERUM 0.62 10/17/2024    CREATSERUM 0.66 02/14/2024    EGFRCR 119 10/17/2024    EGFRCR 118 02/14/2024     Lab Results   Component Value Date    AST 3 (L) 02/14/2024    AST 33 06/12/2023    ALT 26 02/14/2024    ALT 74 (H) 06/12/2023       Lab Results   Component Value Date    TSH 2.233 10/17/2024    T4F 1.4 10/17/2024       Overall glucose control:  Lab Results   Component Value Date    A1C 7.9 (A) 04/16/2025    A1C 6.5 (A) 10/17/2024    A1C 5.7 (A) 04/24/2024    A1C 6.3 (A) 01/05/2024    A1C 6.0 (A) 10/20/2023       Supplementary Documentation:   -Surveillance for Diabetes Complications & Risks  Foot exam/neuropathy: Last Foot Exam: 04/16/25      Retinopathy screening: No data recordedNo data recorded    Assessment & Plan:     ICD-10-CM    1. Type 2 diabetes mellitus treated without insulin (HCC)  E11.9 Microalb/Creat Ratio, Random Urine     POC Hemoglobin A1C     Lancets Does not apply Misc     Glucose Blood (BLOOD GLUCOSE TEST) In Vitro Strip     Blood Glucose Monitoring Suppl Does not apply Kit     Dulaglutide (TRULICITY) 4.5 MG/0.5ML Subcutaneous Solution Auto-injector     Vitamin B12     empagliflozin (JARDIANCE) 25 MG Oral Tab      2. Neuropathy due to secondary diabetes (HCC)  E13.40       3. Hypertension  associated with type 2 diabetes mellitus ([facility])  E11.59     I15.2       4. Hyperlipidemia associated with type 2 diabetes mellitus ([facility])  E11.69 Lipid Panel    E78.5       5. Type 2 diabetes mellitus with other specified complication, without long-term current use of insulin ([facility])  E11.69       6. Class 3 severe obesity due to excess calories with body mass index (BMI) of 40.0 to 44.9 in adult, unspecified whether serious comorbidity present ([facility])  E66.813     E66.01     Z68.41       7. Encounter for vitamin deficiency screening  Z13.21 Vitamin D      8. Type 2 diabetes mellitus without complication, with long-term current use of insulin ([facility])  E11.9 metFORMIN  MG Oral Tablet 24 Hr    Z79.4             Marilynn Jasso is a pleasant 35 year old female here for evaluation of:     #Diabetes w/ neuropathy:   PMHx of Type 2 diabetes mellitus diagnosed in 06/23.    - Last A1c value was 7.9% done 4/16/2025. Trending up, above goal.   - AutoAB for type 1 DM: IA2 (-), Islet cell(-), VICTORIANO 65(-), ZnT8 (-); C peptide:detected - 10/17/25  - Pt has hx of tubal ligation, no pregnancy plans.   - discussed Goal <7%. Importance of better glucose control in preventing onset/progression of end-organ damage discussed, as well as goals of therapy and clinical significance of A1C.  Plan: Patient not checking her glucose, states she lost her glucometer while they were moving.  E prescription new testing supplies.. Patient admitted that she did not take her oral diabetes medicine: Metformin and Jardiance in the last 2 days and also last November when she and her family were moving.  She stated that she picked up 1 month of her Trulicity 3 mg, plan is to finish that dose for now for 4 weeks while increasing metformin and Jardiance.  On week 5 when she starts Trulicity 4.5 mg will back down with metformin and continue same dose of Jardiance 25 mg. Discussed side effects of jardiance and how to counteract it and to let me know if she is not  tolerating it.  Discussed to her importance of balance diet: Carbs, protein, healthy fats and fiber.  Discussed increasing exercise.  Ordered vitamin B12 and vitamin D screening, also because of prolonged use of metformin we will screen for B12 deficiency.  She denied any upcoming procedure or surgery.  Due to her insurance, limited option of other brand names of GLP-1 agonist.  - med changes:  For the next 4 weeks:  Finish Trulicity 3 mg weekly  Increase Metformin 750 mg to twice a day ( total 1500 mg/day)  Increase Jardiance from 10 to 25 mg per day. Okay to finish your remaining 10 mg tab ( to take 20 mg per day for now )  On Week 5  Start Trulicity 4.5 mg weekly  Decrease Metformin from 2 tab to 1 tab 750 mg per tab  Continue Jardiance 25 mg per day   -start checking glucose 1-2 x a day: fasting or 2 hrs after dinner   - Recommended to pt .to see ophthalmology, given her list of ophthalmology    - continue to check BG 1-2x/day using glucometer or CGM  - no strict contraindication for SGLT2i, metformin, GLP1a   - Discussed management of low glucose and provided instructions in AVS   -See above header \"Supplementary Documentation\" for surveillance for diabetes complications & risks  - No open wound at this time. Discussed proper foot care.  - Neuropathy-  monofilament exam done today, normal no open wound noted.    #Nephropathy screening: ordered urine MCR  Lab Results   Component Value Date    EGFRCR 119 10/17/2024    MICROALBCREA 11.3 01/05/2024      #Blood pressure control: SBP is to goal <130   BP Readings from Last 1 Encounters:   04/16/25 126/72   BP Meds:      #Hyperlipidemia  Lipids: LDL is not to goal,   Plan: ordered lipid and discussed to  start taking over the counter fish oil or omega 3, Discussed decrease saturated fat, trans fat, and cholesterol intake  example: Butter, lard, shortening, coconut, coconut oil, palm oil, fried food, bologna,pepperoni, salami, egg yolks, Poultry skin, visible meat  fat.  Lab Results   Component Value Date     (H) 02/14/2024    TRIG 167 (H) 02/14/2024   Cholesterol Meds:      #Vitamin D screening  - Checking level, noted she is not on any multiple vitamins or vitamin D.    #BMI 41/Class 4 obesity due to excess calories/ Type 2 DM with other specified complications   - severe obesity is complicating underlying diabetes,  reinforced importance of glycemic control, see back in 6 weeks    - discussed Balanced diet: carbohydrates, protein, healthy fats and fiber in each meal. Exercise of at least 150 mins each week. Decrease your simple carbohydrates intake such as sweets: cookies, soda, candy, white rice, white pasta, syrups    Return in about 6 weeks (around 5/28/2025) for diabetes follow up.    Total time spent  50 minutes today on obtaining history, reviewing pertinent labs, evaluating patient, providing multiple treatment options, reinforcing diet/exercise and compliance, and completing documentation, of which greater than 50% was spent in face to face discussion with the patient  who demonstrated understanding and agreement with plan.     Thank you for allowing me to participate in the care of this patient.  Please feel free to contact me with any questions.    MAURICE Torre, [facility]  Endocrinology, Diabetes & Metabolism   04/16/25     In reviewing this note, please be advised that Dragon Voice Recognition software used to dictate the note may have made errors in recognizing some of the words or phrases.     Note to patient: The 21 Century Cures Act makes medical notes like these available to patients in the interest of transparency. However, be advised this is a medical document. It is intended as peer to peer communication. It is written in medical language and may contain abbreviations or verbiage that are unfamiliar. It may appear blunt or direct. Medical documents are intended to carry relevant information, facts as evident, and the clinical opinion of the  practitioner.           [1]   Allergies  Allergen Reactions    Ketorolac OTHER (SEE COMMENTS)

## 2025-04-17 NOTE — TELEPHONE ENCOUNTER
Received fax from pharmacy requesting a PA for OneTouch Ultra 2 w/Device kit.    PA initiated through Cover My Meds  key: [id number]    Per Cover My Meds PA:  Preferred glucose kit is NDC [id number] Item: Contour Plus Blue Blood Glucose Monitoring System.\    Phoned pharmacy and confirmed that it went through insurance and will notify patient.     Closing Encounter

## 2025-05-28 NOTE — PROGRESS NOTES
[facility] Note    Name: Marilynn Jasso    Date: 25     Marilynn Jasso is a 35 year old female who presents for evaluation of T2DM management.     Chief complaint: Follow - Up (PT here for routine T2DM f/u, per pt no current concerns or sx./Per patient hasn't been checking blood glucose due to not having supplies./Last A1c value was 7.9% done 2025.)       Subjective:   DM hx:  -Diagnosed with diabetes in 2023  -Family history- yes, father and MGM    Initial HPI consult - 10/17/2024  Last office visit for DM mgmt of the pt:  24  Mgmt by: Cecilia Knight NP from [facility]  LMP 2024, not breastfeeding, had tubal ligation   DM meds at first office visit:  Metformin  m tab daily with breakfast  Jardiance 10 mg daily in am  Trulicity 3 mg every Monday     Blood Glucose log reviewed. Checking every other day. Morning/fasting: Patient did not bring her glucometer today, pt. reported range   episodes of hypoglycemia: No      -Re: potential DM medication contraindication (if positive, checkbox selected):  [] History of pancreatitis- denies   [] Personal/fam hx of medullary thyroid cancer/MEN2- denies  [] History of recent/frequent UTI/yeast infxn - denies   [] Previous amputation related to diabetes- denies    -Presence of associated DM complications (if positive, checkbox selected):  [] Macrovascular complications (CAD/CVA/PAD)- denies  [x] Neuropathy + sometimes feel in her toes.   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 She admitted that she did not take her Metformin and Jardiance in the last 2 days and  also she missed it also during the time they were moving last Nov for couple of weeks.   Current treatment: Metformin  m tab daily with breakfast, Jardiance 10 mg daily in am, Trulicity 3 mg every Monday   Testing: not testing   Hypoglycemia: denies   Complication:  no hospitalization and no ER visit since last office visit.    No plans for pregnancy, had tubal ligation ( past hx of 3 c sections)    Interim Hx with Anitha APN 25  :   Here for follow up  Current treatment: Trulicity 4.5 mg weekly  every  , Jardiance 25 mg daily, Metformin 750 mg twice daily    --> yesterday she forgot to take her oral meds, states due to her son's appointment   Testing:  still not testing glucose   Vision: denies   Numbness and tingling to extremities:denies  Wound:denies   Diet/Activities: eating more veggies, less rice, snacking balanced meal: peanut butter, walking 3-4 blocks   New complication related to DM:   no hospitalization and no ER visit since last office visit.   Upcoming procedure or surgery: denies     History/Other:    Allergies, PMH, SocHx and FHx reviewed and updated as appropriate in Epic on    empagliflozin (JARDIANCE) 25 MG Oral Tab Take 1 tablet (25 mg total) by mouth daily. 90 tablet 0    Semaglutide (RYBELSUS) 7 MG Oral Tab Take 7 mg by mouth daily. 30 tablet 0    Cholecalciferol (VITAMIN D) 50 MCG ( UT) Oral Tab Take by mouth.      Blood Glucose Monitoring Suppl Does not apply Kit Use to test BG daily 1 kit 0    Lancets Does not apply Misc Use to test BG 1x/day. 100 each 2    Glucose Blood (BLOOD GLUCOSE TEST) In Vitro Strip Use to test BG 1x/day 100 strip 2    Blood Glucose Monitoring Suppl Does not apply Kit Use to test BG. 1 kit 0    metFORMIN  MG Oral Tablet 24 Hr Take 1 tablet (750 mg total) by mouth 2 (two) times daily with meals. 180 tablet 1    Blood Glucose Monitoring Suppl (CONTOUR NEXT EZ) w/Device Does not apply Kit 1 kit 3 (three) times daily before meals. Test blood  glucose 3-4 times before meals and for signs/symptoms of hypoglycemia 1 kit 0    Glucose Blood (CONTOUR NEXT TEST) In Vitro Strip Test blood glucose 3-4 times before meals and for signs/symptoms of hypoglycemia 100 strip 2    Microlet Lancets (LEXUS MICROLET LANCETS) Does not apply Misc Test blood glucose 3-4 times before meals and for signs/symptoms of hypoglycemia 100 each 2     Allergies[1]  Current Outpatient Medications   Medication Sig Dispense Refill    empagliflozin (JARDIANCE) 25 MG Oral Tab Take 1 tablet (25 mg total) by mouth daily. 90 tablet 0    Semaglutide (RYBELSUS) 7 MG Oral Tab Take 7 mg by mouth daily. 30 tablet 0    Cholecalciferol (VITAMIN D) 50 MCG (2000 UT) Oral Tab Take by mouth.      Blood Glucose Monitoring Suppl Does not apply Kit Use to test BG daily 1 kit 0    Lancets Does not apply Misc Use to test BG 1x/day. 100 each 2    Glucose Blood (BLOOD GLUCOSE TEST) In Vitro Strip Use to test BG 1x/day 100 strip 2    Blood Glucose Monitoring Suppl Does not apply Kit Use to test BG. 1 kit 0    metFORMIN  MG Oral Tablet 24 Hr Take 1 tablet (750 mg total) by mouth 2 (two) times daily with meals. 180 tablet 1    Blood Glucose Monitoring Suppl (CONTOUR NEXT EZ) w/Device Does not apply Kit 1 kit 3 (three) times daily before meals. Test blood glucose 3-4 times before meals and for signs/symptoms of hypoglycemia 1 kit 0    Glucose Blood (CONTOUR NEXT TEST) In Vitro Strip Test blood glucose 3-4 times before meals and for signs/symptoms of hypoglycemia 100 strip 2    Microlet Lancets (LEXUS MICROLET LANCETS) Does not apply Misc Test blood glucose 3-4 times before meals and for signs/symptoms of hypoglycemia 100 each 2     History reviewed. No pertinent past medical history.  Family History   Problem Relation Age of Onset    Diabetes Father         type 2    Asthma Brother     Diabetes Maternal Grandmother         type 2    Asthma Son     Asthma Son     Asthma Daughter      Social history:  Reviewed.      ROS/Exam    REVIEW OF SYSTEMS: Ten point review of systems has been performed and is otherwise negative and/or non-contributory, except as described above.     VITALS  Vitals:    05/28/25 1041   BP: 124/76   Pulse: 81   SpO2: 97%   Weight: 234 lb (106.1 kg)   Height: 5' 2\" (1.575 m)         Wt Readings from Last 6 Encounters:   05/28/25 234 lb (106.1 kg)   10/17/24 229 lb (103.9 kg)   04/24/24 224 lb 6.4 oz (101.8 kg)   01/05/24 230 lb (104.3 kg)   10/20/23 232 lb 6.4 oz (105.4 kg)   08/18/23 239 lb (108.4 kg)       PHYSICAL EXAM  CONSTITUTIONAL:  awake, alert, cooperative, no apparent distress, and appears stated age Vss stable, obese   PSYCH: normal affect  LUNGS: breathing comfortably  CARDIOVASCULAR:  regular rate   NECK:  no palpable thyroid nodules         Labs/Imaging:     Lab Results   Component Value Date    CHOLEST 198 05/28/2025    CHOLEST 201 (H) 02/14/2024    TRIG 255 (H) 05/28/2025    TRIG 167 (H) 02/14/2024    HDL 36 (L) 05/28/2025    HDL 39 (L) 02/14/2024     (H) 05/28/2025     (H) 02/14/2024     Lab Results   Component Value Date    MICROALBCREA 11.3 01/05/2024    MICROALBCREA 9.1 10/20/2023      Lab Results   Component Value Date    CREATSERUM 0.62 10/17/2024    CREATSERUM 0.66 02/14/2024    EGFRCR 119 10/17/2024    EGFRCR 118 02/14/2024     Lab Results   Component Value Date    AST 3 (L) 02/14/2024    AST 33 06/12/2023    ALT 26 02/14/2024    ALT 74 (H) 06/12/2023       Lab Results   Component Value Date    TSH 2.233 10/17/2024    T4F 1.4 10/17/2024       Overall glucose control:  Lab Results   Component Value Date    A1C 7.9 (A) 04/16/2025    A1C 6.5 (A) 10/17/2024    A1C 5.7 (A) 04/24/2024    A1C 6.3 (A) 01/05/2024    A1C 6.0 (A) 10/20/2023       Supplementary Documentation:   -Surveillance for Diabetes Complications & Risks  Foot exam/neuropathy: Last Foot Exam: 04/16/25      Retinopathy screening: No data recordedNo data recorded    Assessment & Plan:     ICD-10-CM     1. Type 2 diabetes mellitus treated without insulin (HCC)  E11.9 Semaglutide (RYBELSUS) 7 MG Oral Tab     Blood Glucose Monitoring Suppl Does not apply Kit     empagliflozin (JARDIANCE) 25 MG Oral Tab      2. Neuropathy due to secondary diabetes (HCC)  E13.40       3. Screening for nephropathy  Z13.89       4. Hypertension associated with type 2 diabetes mellitus (HCC)  E11.59     I15.2       5. Hyperlipidemia associated with type 2 diabetes mellitus (HCC)  E11.69     E78.5       6. Encounter for vitamin deficiency screening  Z13.21               Marilynn Jasso is a pleasant 35 year old female here for evaluation of:     #Diabetes w/ neuropathy:   PMHx of Type 2 diabetes mellitus diagnosed in 06/23.    - Last A1c value was 7.9% done 4/16/2025.   - AutoAB for type 1 DM: IA2 (-), Islet cell(-), VICTORIANO 65(-), ZnT8 (-); C peptide:detected - 10/17/25  - Pt has hx of tubal ligation, no pregnancy plans.   - discussed Goal <7%. Importance of better glucose control in preventing onset/progression of end-organ damage discussed, as well as goals of therapy and clinical significance of A1C.    Plan: patient is still not checking her glucose currently. Given her sample glucometer and escript glucometer again. Discussed to her that she can get over the counter relion ( cheaper brand) from [facility]. Noted that she is not losing weight from Trulicity, switching to Rybelsus, and to titrate dose to max later on. Patient aware not to take trulicity and Rybelsus at the same time.  She denied any upcoming procedure or surgery.  Due to her insurance, limited option of other brand names of GLP-1 agonist. She is aware to message me in 2.5 weeks if her fasting glucose is mostly above 130, we will add glimiperide.   - Neuropathy-  No open wound at this time. Reminded her to get blood test of Vit B12, will discuss POC in my chart message.   - BMI 42- noted some weight gain- discussed Balanced diet: carbohydrates, protein, healthy fats and fiber  in each meal. Exercise of at least 150 mins each week. Decrease your simple carbohydrates intake such as sweets: cookies, soda, candy, white rice, white pasta, syrups  - med changes:  In the next 3 weeks  Finish trulicity 4.5 mg weekly  Continue Jardiance 25 mg daily, Metformin 750 mg twice daily    In week 4   Stop trulicity and start Rybelsus 7 mg daily in the morning with sip of water only  Continue Jardiance 25 mg daily, Metformin 750 mg twice daily   -  check your glucose with fingerstick machine 1-2x a day fasting or at bedtime.   - Message me in week 2.5 and let me know if fasting glucose is mostly above 130,  and I will send you glimiperide , to take 1 tab  per day   - let me know if you change your mind in using sensor, let me know and I will send a script to your pharmacy,   - continue eye appt on Friday , let the eye clinic to fax US the note   - continue to check BG 1-2x/day using glucometer or CGM  - no strict contraindication for SGLT2i, metformin, GLP1a   - Discussed management of low glucose and provided instructions in AVS   -See above header \"Supplementary Documentation\" for surveillance for diabetes complications & risks      #Nephropathy screening:   Lab Results   Component Value Date    EGFRCR 119 10/17/2024    MICROALBCREA 11.3 01/05/2024    Plan: reminded her to get ordered  urine MCR    #Blood pressure control: SBP is to goal <130   BP Readings from Last 1 Encounters:   05/28/25 124/76   BP Meds:      #Hyperlipidemia  Lipids: LDL is not to goal,   Lab Results   Component Value Date     (H) 05/28/2025    TRIG 255 (H) 05/28/2025   Cholesterol Meds:    Plan:  reminded her to get ordered lipid and discussed to  start taking over the counter fish oil or omega 3, Discussed decrease saturated fat, trans fat, and cholesterol intake  example: Butter, lard, shortening, coconut, coconut oil, palm oil, fried food, bologna,pepperoni, salami, egg yolks, Poultry skin, visible meat fat.  Will discuss  POC in my chart message.     #Vitamin D screening  - Checking level, noted she is not on any multiple vitamins or vitamin D.   - Plan: reminded her to get labs done.         Return in about 7 weeks (around 7/16/2025) for diabetes follow up.    Thank you for allowing me to participate in the care of this patient.  Please feel free to contact me with any questions.    MAURICE Torre, [facility]  Endocrinology, Diabetes & Metabolism   05/28/25     In reviewing this note, please be advised that Dragon Voice Recognition software used to dictate the note may have made errors in recognizing some of the words or phrases.     Note to patient: The 21 Century Cures Act makes medical notes like these available to patients in the interest of transparency. However, be advised this is a medical document. It is intended as peer to peer communication. It is written in medical language and may contain abbreviations or verbiage that are unfamiliar. It may appear blunt or direct. Medical documents are intended to carry relevant information, facts as evident, and the clinical opinion of the practitioner.           [1]   Allergies  Allergen Reactions    Ketorolac OTHER (SEE COMMENTS)

## 2025-05-28 NOTE — PATIENT INSTRUCTIONS
Return Visit:  APN: Return in about 7 weeks (around 7/16/2025) for diabetes follow up.  [] Video visit  [x] In person only      [x]  Directions to 1st floor lab        Summary of today's visit:    Last A1c value was 7.9% done 4/16/2025.    Medication changes:    In the next 3 weeks  Finish trulicity 4.5 mg weekly  Continue Jardiance 25 mg daily, Metformin 750 mg twice daily    In week 4   Stop trulicity and start Rybelsus 7 mg daily in the morning with sip of water only  Continue Jardiance 25 mg daily, Metformin 750 mg twice daily   -  check your glucose with fingerstick machine 1-2x a day fasting or at bedtime.     - Message me in week 2.5 and let me know if fasting glucose is mostly above 130,  and I will send you glimiperide , to take 1 tab  per day     - let me know if you change your mind in using sensor, let me know and I will send a script to your pharmacy,     - continue eye appt on Friday , let the eye clinic to fax US the note     - If we started a new medication today that may not be covered by your insurance, our staff will reach out to you regarding any changes in the plan   - If on insulin, please ensure you have glucagon at home for emergencies   - Targeted glucose levels  - before breakfast or fasting glucose (at least 8-10 hrs of no food/sweetened beverage intake)    -  2 hrs after lunch or dinner < 180   -  before lunch or dinner <140  -  Follow 15g/15 min rule if you develop any hypoglycemia symptoms w/ glucose <70   -  but if glucose <55 follow 30g/15 min rule. Once glucose above 80, eat a protein or food w protein ie cheese, peanut butter, almond butter, cheese, yogurt.     Medication changes  Start Taking               Semaglutide (RYBELSUS) 7 MG Oral Tab Take 7 mg by mouth daily.    Blood Glucose Monitoring Suppl Does not apply Kit Use to test BG daily          Stop Taking                Dulaglutide (TRULICITY) 4.5 MG/0.5ML Subcutaneous Solution Auto-injector    Inject 4.5 mg into the  skin once a week.     TRULICITY 3 MG/0.5ML Subcutaneous Solution Pen-injector    INJECT 3 MG INTO THE SKIN ONCE A WEEK                  Additional comments:   - If labs were ordered today, please complete prior to your follow up visit   - Please let us know if you require any refills at least 1 week prior to your medication running out   - Please call our office if sugars at home are consistently greater than 250 or less than 70     HOW TO TREAT LOW BLOOD SUGAR (Hypoglycemia)  Low blood sugar= Less than 70, or if you start to have symptoms (below)  Symptoms: Shaking or trembling, fast heart rate, extreme hunger, sweating, confusion/difficulty concentrating, dizziness.    How to treat a low blood sugar if you are able to eat/drink: The Rule of 15/15  If you are using continuous glucose monitor that says you are low, but you do not have any symptoms, verify on fingerstick that your blood sugar is actually low before treating.   Eat 15 grams of carbs (see examples below)  Check your blood sugar after 15 minutes. If it’s still below your target range, have another serving.   Repeat these steps until it’s in your target range. Once it’s in range, if you're nervous about your sugar going low again, have a protein source (ie, a spoonful of peanut butter).   If you have a CGM you want to look for how your arrow has changed. If you arrow is pointed up or sideways after 15 min, give your CGM more time OR check with a finger stick. Try not to eat more food until at least 15 min after the first BG check - otherwise you risk having a rebound high.  If you are experiencing symptoms and you are unable to check your blood glucose for any reason, treat the hypoglycemia.  If someone has a low blood sugar and is unconscious: Don’t hesitate to call 911. If someone is unconscious and glucagon is not available or someone does not know how to use it, call 911 immediately.     To treat a low glucose <70, I recommend you carry with you  easy, pre-portioned treatment for low blood sugars that are 15G of carbs:   - Children sized squeeze pouch applesauce (low fiber)  - Small children's sized juicebox- 15g carb --> 4oz juice box  - Glucose tablets from [employer], you can find them near diabetes supplies --> Note, you will need to eat 3-4 tablets to get to 15g of carbs  - Children sized fruit snack pack- look for one with 15 grams of total carbohydrate  - Choice of how to treat your low is important. Complex carbs, or foods that contain fats along with carbs (like chocolate) can slow the absorption of glucose and should NOT be used to treat an emergency low

## 2025-06-17 NOTE — TELEPHONE ENCOUNTER
Endocrine Refill protocol for Glucose testing supplies     Protocol Criteria: PASSED Reason: N/A    If below requirement is met, send a 90-day supply with 1 refill per provider protocol.    Verify appointment with Endocrinology completed in the last 6 months or scheduled in the next 3 months.    Last completed office visit:5/28/2025 Anitha Hyman APRN   Last completed telemed visit: Visit date not found  Next scheduled Follow up:   Future Appointments   Date Time Provider Department Center   6/19/2025  2:30 PM Anitha Hyman APRN [facility]

## 2025-06-19 NOTE — PROGRESS NOTES
The following individual(s) verbally consented to be recorded using ambient AI listening technology and understand that they can each withdraw their consent to this listening technology at any point by asking the clinician to turn off or pause the recording:    Patient name: Marilynn Jasso  Additional names:

## 2025-06-19 NOTE — PATIENT INSTRUCTIONS
Return Visit:  With MAURICE Lundberg: Return in about 3 months (around 9/19/2025) for diabetes follow up.      VISIT SUMMARY:  Today, you had a follow-up visit to manage your type 2 diabetes and discuss your overall health. We reviewed your current medications, blood glucose levels, diet, and physical activity. We also discussed your recent lab results and made some adjustments to your treatment plan to help improve your glucose control.    YOUR PLAN:  -TYPE 2 DIABETES MELLITUS: Type 2 diabetes is a condition where your body does not use insulin properly, leading to high blood sugar levels. Your current medications include Jardiance, Rybelsus, and Metformin.   We will increase your Rybelsus dose to 14 mg once daily for 4 weeks, then switch to Ozempic 2 mg once weekly.   Continue taking Jardiance and Metformin as prescribed.   Please report any blood sugar readings above 250 mg/dL or below 70 mg/dL.   Stay hydrated, maintain fiber intake, and avoid fatty foods before taking Ozempic.   Monitor for any side effects and hold Jardiance if necessary if has dehydration occurrence: diarrhea,vomiting .   We will send you your urine test results via [facility] once available.    -HYPERLIPIDEMIA ASSOCIATED WITH TYPE 2 DIABETES MELLITUS: Hyperlipidemia means you have high levels of fats in your blood, which can increase your risk of heart disease. Your LDL was 117 mg/dL in May.   Continue taking fish oil supplements and follow your dietary modifications, such as reducing fatty and fried foods and using healthy fats like avocado oil.   We will repeat your lipid panel in 3 months to monitor your progress.    INSTRUCTIONS:  - Please follow the new medication plan for your diabetes management, including the increase in Rybelsus and the switch to Ozempic.   - Report any significant changes in your blood sugar levels. Continue with your dietary modifications and fish oil supplementation for hyperlipidemia.   - We will repeat your lipid  panel in 3 months.   - Stay hydrated and maintain fiber intake to prevent constipation.   - We will send your urine test results via [employer] once available.    Contains text generated by Sebastian     Updated diabetes medication instructions from today:   Diabetic Medications              Semaglutide (RYBELSUS) 14 MG Oral Tab (Taking) Take 14 mg by mouth daily.    empagliflozin (JARDIANCE) 25 MG Oral Tab (Taking) Take 1 tablet (25 mg total) by mouth daily.    metFORMIN  MG Oral Tablet 24 Hr (Taking) Take 1 tablet (750 mg total) by mouth 2 (two) times daily with meals.            Lab Results   Component Value Date    A1C 7.9 (A) 04/16/2025    A1C 6.5 (A) 10/17/2024    A1C 5.7 (A) 04/24/2024    A1C 6.3 (A) 01/05/2024    A1C 6.0 (A) 10/20/2023        - Targeted fasting glucose (at least 8-10 hrs of no food/sweetened beverage intake):  and 2 hrs after lunch or dinner< 180,  and prior lunch or dinner <140    General follow up information:  Please let us know if you require any refills at least 1 week prior to your medication running out. If you do run out of medication, please call our office ASAP to request refills (do not wait until your follow up).   Please call our office if sugars at home are consistently greater than 250 or less than 70 for medication adjustment (do not wait until your follow up appointment).  Lab results and imaging will typically be reviewed at follow up appointments, or within 3-5 business days of ALL results being in if you do not have an appointment scheduled in the near future. Our office will contact you for any abnormal results requiring more urgent follow up or action.   The on-call pager is for urgent matters only. If you are a type 1 diabetic and run out of insulin after business hours 8AM-4PM, you may call the on-call pager for a refill to a 24 hour pharmacy.  If you have adrenal insufficiency and run out of steroids, you may call the on-call pager for a refill to a 24 hour  pharmacy. All other refill requests should be requested during business hours.    Office phone number: [phone]; phones are open Monday-Friday 8:30-4:30.       HOW TO TREAT LOW BLOOD SUGAR (Hypoglycemia)  Low blood sugar= Less than 70, or if you start to have symptoms (below)  Symptoms: Shaking or trembling, fast heart rate, extreme hunger, sweating, confusion/difficulty concentrating, dizziness.    How to treat a low blood sugar if you are able to eat/drink: The Rule of 15/15  If you are using continuous glucose monitor that says you are low, but you do not have any symptoms, verify on fingerstick that your blood sugar is actually low before treating.   Eat 15 grams of carbs (see examples below)  Check your blood sugar after 15 minutes. If it’s still below your target range, have another serving.   Repeat these steps until it’s in your target range. Once it’s in range, if you're nervous about your sugar going low again, have a protein source (ie, a spoonful of peanut butter).   If you have a CGM you want to look for how your arrow has changed. If you arrow is pointed up or sideways after 15 min, give your CGM more time OR check with a finger stick. Try not to eat more food until at least 15 min after the first BG check - otherwise you risk having a rebound high.  If you are experiencing symptoms and you are unable to check your blood glucose for any reason, treat the hypoglycemia.  If someone has a low blood sugar and is unconscious: Don’t hesitate to call 911. If someone is unconscious and glucagon is not available or someone does not know how to use it, call 911 immediately.     To treat a low, I recommend you carry with you easy, pre-portioned treatment for low blood sugars that are 15G of carbs:   - Children sized squeeze pouch applesauce (high fiber + carbs help prevent too high of a spike)  - Small children's sized juicebox- 15g carb --> 4oz juice box  - Glucose tablets from [employer], you can find  them near diabetes supplies --> Note, you will need to eat 3-4 tablets to get to 15g of carbs  - Children sized fruit snack pack- look for one with 15 grams of total carbohydrate  - Choice of how to treat your low is important. Complex carbs, or foods that contain fats along with carbs (like chocolate) can slow the absorption of glucose and should NOT be used to treat an emergency low

## 2025-06-19 NOTE — PROGRESS NOTES
[facility] [place] [facility] [place] [facility] [place]    Name: Marilynn Jasso    Date: 6/19/2025     Chief complaint: Follow - Up (PT here for routine T2DM f/u, per pt no current concerns or sx./Per pt checks BG via fingerstick, fasting 151 today./Last A1c value was 7.9% done 4/16/2025.)       Subjective:   187  History of Present Illness  Marilynn Jasso is a 35 year old female with type 2 diabetes who presents for a follow-up visit.    She manages her type 2 diabetes with Jardiance 25 mg once daily, Rybelsus 7 mg once daily, and Metformin 750 mg twice daily, without experiencing constipation, diarrhea, nausea, vomiting, yeast infections, or UTIs. Her fasting glucose levels over the past week have ranged from 151 to 225 mg/dL. She occasionally forgets to take her Metformin, missing doses about three times last week due to her son's baseball schedule. Her average glucose over the last seven days is 154 mg/dL, and over the last fourteen days is 187 mg/dL. No episodes of hypoglycemia have been reported.    Her diet includes vegetables and protein, with reduced rice intake. She occasionally consumes soda at parties but avoids regular consumption. She engages in physical activity by walking frequently, especially during her son's baseball practices. No new numbness, tingling, chest pain, shortness of breath, or wounds. She has not been hospitalized or visited the emergency room since her last appointment.    She has started taking fish oil supplements, two tablets daily, for cholesterol management. Her LDL was 117 mg/dL in May. She has switched from using butter to avocado oil for cooking. She recently had an eye exam on June 11, 2025, with no signs of retinopathy.    She takes prescription vitamin D once a week and plans to switch to over-the-counter vitamin D after finishing her current prescription. She reports frequent urination, especially at night, which she attributes to Jardiance. She maintains good hygiene practices after  urination.    DM meds at office visit:      empagliflozin (JARDIANCE) 25 MG Oral Tab (Taking) Take 1 tablet (25 mg total) by mouth daily.    Semaglutide (RYBELSUS) 7 MG Oral Tab (Taking) Take 7 mg by mouth daily.    metFORMIN  MG Oral Tablet 24 Hr (Taking) Take 1 tablet (750 mg total) by mouth 2 (two) times daily with meals.       Testing: glucometer      History/Other:    Allergies, PMH, SocHx and FHx reviewed and updated as appropriate in Epic on    Omega-3 Fatty Acids (FISH OIL) 300 MG Oral Cap Take 1 tablet by mouth in the morning and 1 tablet before bedtime.      Semaglutide (RYBELSUS) 14 MG Oral Tab Take 14 mg by mouth daily. 30 tablet 0    empagliflozin (JARDIANCE) 25 MG Oral Tab Take 1 tablet (25 mg total) by mouth daily. 90 tablet 1    metFORMIN  MG Oral Tablet 24 Hr Take 1 tablet (750 mg total) by mouth 2 (two) times daily with meals. 180 tablet 1    Glucose Blood (BLOOD GLUCOSE TEST) In Vitro Strip Use to test BG 1x/day 100 strip 2    ergocalciferol 1.25 MG (01605 UT) Oral Cap Take 1 capsule (50,000 Units total) by mouth once a week. 12 capsule 0    Cholecalciferol (VITAMIN D) 50 MCG (2000 UT) Oral Tab Take by mouth.      Blood Glucose Monitoring Suppl Does not apply Kit Use to test BG daily 1 kit 0    Lancets Does not apply Misc Use to test BG 1x/day. 100 each 2    Blood Glucose Monitoring Suppl Does not apply Kit Use to test BG. 1 kit 0    Blood Glucose Monitoring Suppl (CONTOUR NEXT EZ) w/Device Does not apply Kit 1 kit 3 (three) times daily before meals. Test blood glucose 3-4 times before meals and for signs/symptoms of hypoglycemia 1 kit 0    Glucose Blood (CONTOUR NEXT TEST) In Vitro Strip Test blood glucose 3-4 times before meals and for signs/symptoms of hypoglycemia 100 strip 2    Microlet Lancets (Gigya MICROLET LANCETS) Does not apply Misc Test blood glucose 3-4 times before meals and for signs/symptoms of hypoglycemia 100 each 2     Allergies   Allergen Reactions    Ketorolac  OTHER (SEE COMMENTS)     Current Outpatient Medications   Medication Sig Dispense Refill    Omega-3 Fatty Acids (FISH OIL) 300 MG Oral Cap Take 1 tablet by mouth in the morning and 1 tablet before bedtime.      Semaglutide (RYBELSUS) 14 MG Oral Tab Take 14 mg by mouth daily. 30 tablet 0    empagliflozin (JARDIANCE) 25 MG Oral Tab Take 1 tablet (25 mg total) by mouth daily. 90 tablet 1    metFORMIN  MG Oral Tablet 24 Hr Take 1 tablet (750 mg total) by mouth 2 (two) times daily with meals. 180 tablet 1    Glucose Blood (BLOOD GLUCOSE TEST) In Vitro Strip Use to test BG 1x/day 100 strip 2    ergocalciferol 1.25 MG (87904 UT) Oral Cap Take 1 capsule (50,000 Units total) by mouth once a week. 12 capsule 0    Cholecalciferol (VITAMIN D) 50 MCG (2000 UT) Oral Tab Take by mouth.      Blood Glucose Monitoring Suppl Does not apply Kit Use to test BG daily 1 kit 0    Lancets Does not apply Misc Use to test BG 1x/day. 100 each 2    Blood Glucose Monitoring Suppl Does not apply Kit Use to test BG. 1 kit 0    Blood Glucose Monitoring Suppl (CONTOUR NEXT EZ) w/Device Does not apply Kit 1 kit 3 (three) times daily before meals. Test blood glucose 3-4 times before meals and for signs/symptoms of hypoglycemia 1 kit 0    Glucose Blood (CONTOUR NEXT TEST) In Vitro Strip Test blood glucose 3-4 times before meals and for signs/symptoms of hypoglycemia 100 strip 2    Microlet Lancets (LEXUS MICROLET LANCETS) Does not apply Misc Test blood glucose 3-4 times before meals and for signs/symptoms of hypoglycemia 100 each 2     History reviewed. No pertinent past medical history.  Family History   Problem Relation Age of Onset    Diabetes Father         type 2    Asthma Brother     Diabetes Maternal Grandmother         type 2    Asthma Son     Asthma Son     Asthma Daughter      Social history: Reviewed.      ROS/Exam    REVIEW OF SYSTEMS: Ten point review of systems has been performed and is otherwise negative and/or non-contributory,  except as described above.     VITALS  Vitals:    06/19/25 1336   BP: 126/72   Pulse: 85   SpO2: 98%   Weight: 231 lb (104.8 kg)   Height: 5' 2\" (1.575 m)       Body mass index is 42.25 kg/m².  Wt Readings from Last 6 Encounters:   06/19/25 231 lb (104.8 kg)   05/28/25 234 lb (106.1 kg)   10/17/24 229 lb (103.9 kg)   04/24/24 224 lb 6.4 oz (101.8 kg)   01/05/24 230 lb (104.3 kg)   10/20/23 232 lb 6.4 oz (105.4 kg)       PHYSICAL EXAM  CONSTITUTIONAL:  awake, alert, cooperative, no apparent distress, and appears stated age, morbid obese  PSYCH: normal affect  LUNGS: breathing comfortably  CARDIOVASCULAR:  regular rate   NECK:  no palpable thyroid nodules     Labs/Imaging:   Lab Results   Component Value Date    CHOLEST 198 05/28/2025    CHOLEST 201 (H) 02/14/2024    TRIG 255 (H) 05/28/2025    TRIG 167 (H) 02/14/2024    HDL 36 (L) 05/28/2025    HDL 39 (L) 02/14/2024     (H) 05/28/2025     (H) 02/14/2024     Lab Results   Component Value Date    MICROALBCREA  06/19/2025      Comment:      Unable to calculate due to Urine Microalbumin <0.3 mg/dL        MICROALBCREA 11.3 01/05/2024      Lab Results   Component Value Date    CREATSERUM 0.62 10/17/2024    CREATSERUM 0.66 02/14/2024    EGFRCR 119 10/17/2024    EGFRCR 118 02/14/2024     Lab Results   Component Value Date    AST 3 (L) 02/14/2024    AST 33 06/12/2023    ALT 26 02/14/2024    ALT 74 (H) 06/12/2023       Overall glucose control:  Lab Results   Component Value Date    A1C 7.9 (A) 04/16/2025    A1C 6.5 (A) 10/17/2024    A1C 5.7 (A) 04/24/2024    A1C 6.3 (A) 01/05/2024    A1C 6.0 (A) 10/20/2023       Supplementary Documentation:   -Surveillance for Diabetes Complications & Risks  Foot exam/neuropathy: Last Foot Exam: 04/16/25    Retinopathy screening: Last Dilated Eye Exam: 06/11/25  Eye Exam shows Diabetic Retinopathy?: No ([facility] vision care and contact lens- no DR)    Lipid screening:   Lab Results   Component Value Date    LDL  117 (H) 05/28/2025    TRIG 255 (H) 05/28/2025   Cholesterol Meds:       Nephropathy screening:   Lab Results   Component Value Date    EGFRCR 119 10/17/2024    MICROALBCREA  06/19/2025      Comment:      Unable to calculate due to Urine Microalbumin <0.3 mg/dL       No results found for: \"CREAUR\", \"MICROALBUMIN\", \"MALBCREACALC\"  Blood pressure control:   BP Readings from Last 1 Encounters:   06/19/25 126/72   BP Meds:        Assessment & Plan:   Overview:   1. Type 2 diabetes mellitus with hyperglycemia, without long-term current use of insulin ([facility]) (Primary)  Overview:  DM hx:  -Diagnosed with diabetes in 6/2023  -Family history- yes, father and MGM  - AutoAB for type 1 DM: IA2 (-), Islet cell(-), VICTORIANO 65(-), ZnT8 (-); C peptide:detected - 10/17/25     Previously trialed/failed DM meds:   Lantus, Humalog - improved glucose control   Trulicity- switched to Rybelsus    Pt has hx of tubal ligation, no pregnancy plans.   Orders:  -     Rybelsus; Take 14 mg by mouth daily.  Dispense: 30 tablet; Refill: 0  -     Empagliflozin; Take 1 tablet (25 mg total) by mouth daily.  Dispense: 90 tablet; Refill: 1  -     metFORMIN HCl ER; Take 1 tablet (750 mg total) by mouth 2 (two) times daily with meals.  Dispense: 180 tablet; Refill: 1  2. Hyperlipidemia associated with type 2 diabetes mellitus (HCC)    Assessment & Plan  Type 2 diabetes mellitus  Type 2 diabetes mellitus with suboptimal glucose control. Current medications include Empagliflozin (Jardiance) 25 mg daily, Semaglutide (Rybelsus) 7 mg daily, and Metformin  mg twice daily. Blood glucose readings range from 151 to 225 mg/dL, with an average glucose of 154 mg/dL over the last 7 days and 187 mg/dL over the last 14 days, correlating to an estimated A1c of 7.0% to 8.5%. Reports occasional missed doses of Metformin due to a busy schedule. No episodes of hypoglycemia reported. No new symptoms of neuropathy or nephropathy. Weight has decreased from 234 lbs to 231 lbs  since last visit. Blood pressure is well controlled at 126/72 mmHg.. Adherent to current medications, occasionally forgets her metformin and adherent with her lifestyle modifications, including diet and exercise.   Plan: Discussed switching from Rybelsus to Ozempic for better glucose control and convenience of weekly dosing. Advised to avoid fatty foods before injection.  Due to her insurance we need to follow what is the recommended steps by her insurance in terms of utilizing GLP-1 agonist.  She already used Trulicity with minimal weight loss, now she is on Rybelsus and plan today of maximizing the dose.  Plan to switch that to Ozempic  - Increase Rybelsus from 7 mg to 14 mg once daily for 4 weeks.  - Continue Empagliflozin (Jardiance) 25 mg daily and Metformin  mg twice daily.  - In week 5, discontinue Rybelsus and initiate Ozempic 2 mg once weekly.  - Advise to report any glucose readings persistently above 250 mg/dL or below 70 mg/dL.  - Educate on potential side effects of Ozempic and advise to avoid fatty foods before injection.  - Advise to maintain hydration and fiber intake to prevent constipation.  - Monitor for any signs of dehydration or adverse effects and hold Jardiance if necessary.  - Provide MyChart message with urine test results once available.  - To repeat CMP and A1c 1 week before follow-up.    Hyperlipidemia associated with type 2 diabetes mellitus  Hyperlipidemia with LDL of 117 mg/dL as of May 28, 2025.  Due to her age, will defer starting statin for now.  She started fish oil supplementation as recommended. Dietary modifications include reduced intake of fatty and fried foods, and increased consumption of healthy fats such as avocado oil.  - Continue fish oil supplementation.  - Repeat lipid panel in 3 months.  - Advise to continue dietary modifications, including reducing fatty and fried foods and incorporating healthy fats.      Updated DM med list:   Diabetic Medications               Semaglutide (RYBELSUS) 14 MG Oral Tab (Taking) Take 14 mg by mouth daily.    empagliflozin (JARDIANCE) 25 MG Oral Tab (Taking) Take 1 tablet (25 mg total) by mouth daily.    metFORMIN  MG Oral Tablet 24 Hr (Taking) Take 1 tablet (750 mg total) by mouth 2 (two) times daily with meals.          See above header \"Supplementary Documentation\" for surveillance for diabetes complications & risks    Return in about 3 months (around 9/19/2025) for diabetes follow up.    MAURICE Lundberg  Endocrinology, Diabetes & Metabolism   6/19/2025    Note to patient: The 21 Century Cures Act makes medical notes like these available to patients in the interest of transparency. However, be advised this is a medical document. It is intended as peer to peer communication. It is written in medical language and may contain abbreviations or verbiage that are unfamiliar. It may appear blunt or direct. Medical documents are intended to carry relevant information, facts as evident, and the clinical opinion of the practitioner.

## 2025-07-22 NOTE — TELEPHONE ENCOUNTER
Endocrine Refill protocol for oral and injectable diabetic medications    Protocol Criteria:  PASSED  Reason: N/A    If all below requirements are met, send a 90-day supply with 1 refill per provider protocol.    Verify appointment with Endocrinology completed in the last 6 months or scheduled in the next 3 months.  Verify A1C has been completed within the last 6 months and is below 8.5%     Last completed office visit: 6/19/2025 Anitha Hyman APRN   Next scheduled Follow up:   Future Appointments   Date Time Provider Department Center   9/18/2025 11:00 AM Anitha Hyman APRN [facility]      Last A1c result: Last A1c value was 7.9% done 4/16/2025.

## (undated) NOTE — LETTER
3/25/2024    Personal and Confidential  Via Certified Mail    Marilynn Jasso  [address] [facility] [address]    Dear Ms. Jasso,     At [facility], patient care is our priority.  To best serve our patients, our mission is to be a partner in your healthcare.  Just as you have expectations of your physician and their office staff, they have expectations of you, their patient.  We expect that you will always communicate honestly and openly.  Once a plan of care is developed, we expect that you will follow the reasonable recommendations of your provider.  We will do our best to work with you to overcome any barriers that  your way from meeting your provider's expectations.    Occasionally, there are “bumps in the road” just like with any relationship, and we would like to point out something that recently occurred with you that we hope will not negatively impact our relationship.  On .*** date, *** (describe what happened that was unacceptable)   For example:  “your conduct toward our staff was not appropriate.  During your contact with staff, you behaved negatively and were disrespectful when you contacted this office about prescriptions.”    We would appreciate that if you need to cancel an appointment in the future, you provide the office with 24 hours notice.      Sincerely,    MAURICE Dietrich  [facility], [address] [place] [id number]  Dept: [phone]  Dept Fax: [phone]

## (undated) NOTE — LETTER
November 29, 2018    Patient: Luisa Gilbert   Date of Visit: 11/29/2018       To Whom It May Concern:    Luisa Gilbert was seen and treated in our emergency department on 11/29/2018.  She should not return to work until Patient not return to work until

## (undated) NOTE — ED AVS SNAPSHOT
Trina Nathan   MRN: [id number]    Department:  [facility] Emergency Department   Date of Visit:  11/29/2018           Disclosure     Insurance plans vary and the physician(s) referred by the ER may not be covered by your plan.  Please contact your tell this physician (or your personal doctor if your instructions are to return to your personal doctor) about any new or lasting problems. The primary care or specialist physician will see patients referred from the [facility] Emergency Department.  Jef Nevarez